# Patient Record
Sex: FEMALE | Race: BLACK OR AFRICAN AMERICAN | NOT HISPANIC OR LATINO | Employment: UNEMPLOYED | URBAN - METROPOLITAN AREA
[De-identification: names, ages, dates, MRNs, and addresses within clinical notes are randomized per-mention and may not be internally consistent; named-entity substitution may affect disease eponyms.]

---

## 2023-05-26 LAB
CLUE CELLS: PRESENT
NUGENT SCORE: 4
VAGINITIS-BV + YEAST INTERPRETATION: ABNORMAL
YEAST: ABNORMAL

## 2023-09-13 LAB
CHLAMYDIA TRACH., AMPLIFIED: NEGATIVE
N. GONORRHEA, AMPLIFIED: NEGATIVE

## 2023-09-15 LAB
GENITAL CULTURE: ABNORMAL
GENITAL CULTURE: ABNORMAL

## 2023-11-02 PROBLEM — N89.8 VAGINAL DISCHARGE: Status: ACTIVE | Noted: 2023-11-02

## 2023-11-02 PROBLEM — Z97.5 IUD CONTRACEPTION: Status: ACTIVE | Noted: 2023-11-02

## 2023-11-02 PROBLEM — R71.0 HEMOGLOBIN DROP: Status: ACTIVE | Noted: 2023-11-02

## 2023-11-02 PROBLEM — G47.00 INSOMNIA: Status: ACTIVE | Noted: 2023-11-02

## 2023-11-02 PROBLEM — B37.31 CANDIDA VAGINITIS: Status: ACTIVE | Noted: 2023-11-02

## 2023-11-02 PROBLEM — D25.9 FIBROID UTERUS: Status: ACTIVE | Noted: 2023-11-02

## 2023-11-02 PROBLEM — J30.9 ALLERGIC RHINITIS: Status: ACTIVE | Noted: 2023-11-02

## 2023-11-02 PROBLEM — E55.9 VITAMIN D DEFICIENCY: Status: ACTIVE | Noted: 2023-11-02

## 2023-11-02 PROBLEM — R51.9 HEADACHE: Status: ACTIVE | Noted: 2023-11-02

## 2023-11-02 PROBLEM — S52.121A RIGHT RADIAL HEAD FRACTURE: Status: ACTIVE | Noted: 2023-11-02

## 2023-11-02 PROBLEM — N76.0 VAGINITIS: Status: ACTIVE | Noted: 2023-11-02

## 2023-11-02 PROBLEM — R14.3 FLATULENCE: Status: ACTIVE | Noted: 2023-11-02

## 2023-11-02 PROBLEM — N92.0 MENORRHAGIA: Status: ACTIVE | Noted: 2023-11-02

## 2023-11-02 PROBLEM — R73.01 IFG (IMPAIRED FASTING GLUCOSE): Status: ACTIVE | Noted: 2023-11-02

## 2023-11-02 PROBLEM — G43.829 MENSTRUAL MIGRAINE: Status: ACTIVE | Noted: 2023-11-02

## 2023-11-02 PROBLEM — N92.6 IRREGULAR BLEEDING: Status: ACTIVE | Noted: 2023-11-02

## 2023-11-02 PROBLEM — F43.89 ADJUSTMENT REACTION TO CHRONIC STRESS: Status: ACTIVE | Noted: 2023-11-02

## 2023-11-02 PROBLEM — S63.501A SPRAIN OF RIGHT WRIST: Status: ACTIVE | Noted: 2023-11-02

## 2023-11-02 PROBLEM — R53.83 FATIGUE: Status: ACTIVE | Noted: 2023-11-02

## 2023-11-02 PROBLEM — R89.9 ABNORMAL LABORATORY TEST RESULT: Status: ACTIVE | Noted: 2023-11-02

## 2023-11-02 PROBLEM — R92.30 DENSE BREAST TISSUE ON MAMMOGRAM: Status: ACTIVE | Noted: 2023-11-02

## 2023-11-02 PROBLEM — N83.8 OVARIAN MASS, LEFT: Status: ACTIVE | Noted: 2023-11-02

## 2023-11-02 PROBLEM — D50.9 IDA (IRON DEFICIENCY ANEMIA): Status: ACTIVE | Noted: 2023-11-02

## 2023-11-02 PROBLEM — S59.909A ELBOW INJURY: Status: ACTIVE | Noted: 2023-11-02

## 2023-11-02 RX ORDER — FLUTICASONE PROPIONATE 50 MCG
1 SPRAY, SUSPENSION (ML) NASAL 2 TIMES DAILY
COMMUNITY
Start: 2020-12-30 | End: 2023-11-17

## 2023-11-02 RX ORDER — VIT C/E/ZN/COPPR/LUTEIN/ZEAXAN 250MG-90MG
CAPSULE ORAL
COMMUNITY
Start: 2021-06-24

## 2023-11-02 RX ORDER — NORETHINDRONE ACETATE AND ETHINYL ESTRADIOL .03; 1.5 MG/1; MG/1
1 TABLET ORAL DAILY
COMMUNITY
Start: 2019-03-11 | End: 2023-11-03 | Stop reason: ALTCHOICE

## 2023-11-02 RX ORDER — SERTRALINE HYDROCHLORIDE 50 MG/1
1 TABLET, FILM COATED ORAL DAILY
COMMUNITY
Start: 2022-04-12 | End: 2023-11-03 | Stop reason: ALTCHOICE

## 2023-11-02 RX ORDER — AZELASTINE 1 MG/ML
SPRAY, METERED NASAL 2 TIMES DAILY
COMMUNITY
Start: 2022-04-12 | End: 2023-11-17

## 2023-11-02 RX ORDER — TRAZODONE HYDROCHLORIDE 50 MG/1
1 TABLET ORAL NIGHTLY PRN
COMMUNITY
Start: 2019-10-17 | End: 2023-11-03 | Stop reason: ALTCHOICE

## 2023-11-03 ENCOUNTER — OFFICE VISIT (OUTPATIENT)
Dept: OBSTETRICS AND GYNECOLOGY | Facility: CLINIC | Age: 43
End: 2023-11-03
Payer: COMMERCIAL

## 2023-11-03 VITALS
BODY MASS INDEX: 28.88 KG/M2 | HEIGHT: 63 IN | DIASTOLIC BLOOD PRESSURE: 75 MMHG | WEIGHT: 163 LBS | SYSTOLIC BLOOD PRESSURE: 121 MMHG

## 2023-11-03 DIAGNOSIS — Z30.011 ENCOUNTER FOR INITIAL PRESCRIPTION OF CONTRACEPTIVE PILLS: Primary | ICD-10-CM

## 2023-11-03 PROCEDURE — 1036F TOBACCO NON-USER: CPT | Performed by: OBSTETRICS & GYNECOLOGY

## 2023-11-03 PROCEDURE — 99214 OFFICE O/P EST MOD 30 MIN: CPT | Performed by: OBSTETRICS & GYNECOLOGY

## 2023-11-03 RX ORDER — NORETHINDRONE ACETATE AND ETHINYL ESTRADIOL 1MG-20(21)
1 KIT ORAL DAILY
Qty: 28 TABLET | Refills: 11 | Status: SHIPPED | OUTPATIENT
Start: 2023-11-03 | End: 2023-11-17

## 2023-11-03 RX ORDER — IBUPROFEN 400 MG/1
400 TABLET ORAL EVERY 6 HOURS PRN
COMMUNITY
Start: 2023-01-30 | End: 2023-11-17

## 2023-11-03 RX ORDER — LEVONORGESTREL 52 MG/1
INTRAUTERINE DEVICE INTRAUTERINE
COMMUNITY
Start: 2023-03-16 | End: 2023-11-17

## 2023-11-03 RX ORDER — ACETAMINOPHEN 325 MG/1
650 TABLET ORAL EVERY 4 HOURS PRN
COMMUNITY
Start: 2023-01-30 | End: 2023-11-17

## 2023-11-03 ASSESSMENT — PAIN SCALES - GENERAL: PAINLEVEL: 0-NO PAIN

## 2023-11-03 NOTE — PROGRESS NOTES
Patient is here for removal of IUD patient has been using IUD for 3 years but for the last 6 months this irregular bleeding on and off quite frequent patient would like to be removed and she will start oral contraceptives  CVA negative abdomen soft without masses or tenderness EXTR no joint normal vagina clear bloody discharge in the vaginal vault cervix with IUD string visible in the cervix uterus midposition slightly tender adnexa negative    IUD removed without difficulty using uterine forcepsExamination of IUD after removal confirmed that IUD was completely  Impression satisfactory removal of the IUD contraceptive management patient will start Junel 120

## 2023-11-09 ENCOUNTER — TELEPHONE (OUTPATIENT)
Dept: OBSTETRICS AND GYNECOLOGY | Facility: CLINIC | Age: 43
End: 2023-11-09

## 2023-11-09 NOTE — TELEPHONE ENCOUNTER
Patient has had symptoms of Heavy excessive bleeding for 2 days since having her iud removed on last Friday patient would like to speak with her Dr. Contact [phone]

## 2023-11-16 ENCOUNTER — HOSPITAL ENCOUNTER (EMERGENCY)
Facility: HOSPITAL | Age: 43
Discharge: HOME | End: 2023-11-16
Attending: EMERGENCY MEDICINE
Payer: COMMERCIAL

## 2023-11-16 VITALS
RESPIRATION RATE: 18 BRPM | OXYGEN SATURATION: 99 % | DIASTOLIC BLOOD PRESSURE: 89 MMHG | HEART RATE: 83 BPM | SYSTOLIC BLOOD PRESSURE: 135 MMHG | TEMPERATURE: 98.2 F

## 2023-11-16 DIAGNOSIS — Z87.42 HISTORY OF HEAVY VAGINAL BLEEDING: Primary | ICD-10-CM

## 2023-11-16 DIAGNOSIS — Z86.018 HISTORY OF UTERINE FIBROID: ICD-10-CM

## 2023-11-16 LAB
ALBUMIN SERPL BCP-MCNC: 4.4 G/DL (ref 3.4–5)
ALP SERPL-CCNC: 43 U/L (ref 33–110)
ALT SERPL W P-5'-P-CCNC: 11 U/L (ref 7–45)
ANION GAP SERPL CALC-SCNC: 11 MMOL/L (ref 10–20)
AST SERPL W P-5'-P-CCNC: 17 U/L (ref 9–39)
B-HCG SERPL-ACNC: <2 MIU/ML
BASOPHILS # BLD AUTO: 0.05 X10*3/UL (ref 0–0.1)
BASOPHILS NFR BLD AUTO: 0.9 %
BILIRUB SERPL-MCNC: 0.4 MG/DL (ref 0–1.2)
BUN SERPL-MCNC: 20 MG/DL (ref 6–23)
CALCIUM SERPL-MCNC: 8.8 MG/DL (ref 8.6–10.3)
CHLORIDE SERPL-SCNC: 108 MMOL/L (ref 98–107)
CO2 SERPL-SCNC: 24 MMOL/L (ref 21–32)
CREAT SERPL-MCNC: 1.08 MG/DL (ref 0.5–1.05)
EOSINOPHIL # BLD AUTO: 0.1 X10*3/UL (ref 0–0.7)
EOSINOPHIL NFR BLD AUTO: 1.9 %
ERYTHROCYTE [DISTWIDTH] IN BLOOD BY AUTOMATED COUNT: 14.7 % (ref 11.5–14.5)
GFR SERPL CREATININE-BSD FRML MDRD: 65 ML/MIN/1.73M*2
GLUCOSE SERPL-MCNC: 85 MG/DL (ref 74–99)
HCT VFR BLD AUTO: 28.7 % (ref 36–46)
HGB BLD-MCNC: 8.6 G/DL (ref 12–16)
IMM GRANULOCYTES # BLD AUTO: 0.02 X10*3/UL (ref 0–0.7)
IMM GRANULOCYTES NFR BLD AUTO: 0.4 % (ref 0–0.9)
LYMPHOCYTES # BLD AUTO: 1.45 X10*3/UL (ref 1.2–4.8)
LYMPHOCYTES NFR BLD AUTO: 27.4 %
MCH RBC QN AUTO: 26.3 PG (ref 26–34)
MCHC RBC AUTO-ENTMCNC: 30 G/DL (ref 32–36)
MCV RBC AUTO: 88 FL (ref 80–100)
MONOCYTES # BLD AUTO: 0.41 X10*3/UL (ref 0.1–1)
MONOCYTES NFR BLD AUTO: 7.7 %
NEUTROPHILS # BLD AUTO: 3.27 X10*3/UL (ref 1.2–7.7)
NEUTROPHILS NFR BLD AUTO: 61.7 %
NRBC BLD-RTO: 0 /100 WBCS (ref 0–0)
PLATELET # BLD AUTO: 304 X10*3/UL (ref 150–450)
POTASSIUM SERPL-SCNC: 3.7 MMOL/L (ref 3.5–5.3)
PROT SERPL-MCNC: 7.6 G/DL (ref 6.4–8.2)
RBC # BLD AUTO: 3.27 X10*6/UL (ref 4–5.2)
SODIUM SERPL-SCNC: 139 MMOL/L (ref 136–145)
WBC # BLD AUTO: 5.3 X10*3/UL (ref 4.4–11.3)

## 2023-11-16 PROCEDURE — 84702 CHORIONIC GONADOTROPIN TEST: CPT | Performed by: EMERGENCY MEDICINE

## 2023-11-16 PROCEDURE — 36415 COLL VENOUS BLD VENIPUNCTURE: CPT | Performed by: EMERGENCY MEDICINE

## 2023-11-16 PROCEDURE — 99285 EMERGENCY DEPT VISIT HI MDM: CPT | Performed by: EMERGENCY MEDICINE

## 2023-11-16 PROCEDURE — 99284 EMERGENCY DEPT VISIT MOD MDM: CPT

## 2023-11-16 PROCEDURE — 80053 COMPREHEN METABOLIC PANEL: CPT | Performed by: EMERGENCY MEDICINE

## 2023-11-16 PROCEDURE — 99283 EMERGENCY DEPT VISIT LOW MDM: CPT

## 2023-11-16 PROCEDURE — 85025 COMPLETE CBC W/AUTO DIFF WBC: CPT | Performed by: EMERGENCY MEDICINE

## 2023-11-16 RX ORDER — NORETHINDRONE 5 MG/1
5 TABLET ORAL DAILY
Qty: 7 TABLET | Refills: 0 | Status: SHIPPED | OUTPATIENT
Start: 2023-11-16 | End: 2023-11-30 | Stop reason: ALTCHOICE

## 2023-11-16 ASSESSMENT — COLUMBIA-SUICIDE SEVERITY RATING SCALE - C-SSRS
1. IN THE PAST MONTH, HAVE YOU WISHED YOU WERE DEAD OR WISHED YOU COULD GO TO SLEEP AND NOT WAKE UP?: NO
6. HAVE YOU EVER DONE ANYTHING, STARTED TO DO ANYTHING, OR PREPARED TO DO ANYTHING TO END YOUR LIFE?: NO
2. HAVE YOU ACTUALLY HAD ANY THOUGHTS OF KILLING YOURSELF?: NO

## 2023-11-16 NOTE — ED PROVIDER NOTES
HPI   Chief Complaint   Patient presents with    Vaginal Bleeding     Patient got an IUD in April started bleeding.  Someday's very heavy requiring disposable underwear other days just spotting. Got IUD removed on Nov 3rd.  Still passing chicken nugget sized clots with little bleeding. Denies blood thinners       HPI  Patient is a 43-year-old female with a past medical history significant for G6PD deficiency, anemia, uterine fibroids who presents for vaginal bleeding.  Patient states that she has a history of menometrorrhagia.  She was on an IUD that was placed in April of this year but continued to have spotting and bleeding so it was discontinued in favor of starting her on oral contraceptive.  She is currently on Blisovi 24 FE.  She states that despite this she continues to have heavy bleeding and since discontinuation of her IUD on 3 November she has progressively more bleeding.  On November 5 through 8 she was going through 1 pad per day.  Yesterday and today she has noted clots.  She denies any significant pain, dizziness, lightheadedness, syncope.  She is here because she is frustrated that she continues to bleed and the oral contraceptives are not helping.      PMHx: As above  PSHx: Denies pertinent  FamilyHx: Denies pertinent  SocialHx: Denies  Allergies: NKDA  Medications: See Medication Reconciliation     ROS  As above but otherwise denies    Physical Exam    GENERAL: Awake and Alert, No Acute Distress  HEENT: AT/NC, PERRL, EOMI, Normal Oropharynx, No Signs of Dehydration  NECK: Normal Inspection, No JVD  CARDIOVASCULAR: RRR, No M/R/G  RESPIRATORY: CTA Bilaterally, No Wheezes, Rales or Rhonchi, Chest Wall Non-tender  ABDOMEN: Soft, non-tender abdomen, Normal Bowel Sounds, No Distention  BACK: No CVA Tenderness  SKIN: Normal Color, Warm, Dry, No Rashes   EXTREMITIES: Non-Tender, Full ROM, No Pedal Edema  NEURO: A&O x 3, Normal Motor and Sensation, Normal Mood and Affect    Nursing Assessment and Vitals  Reviewed    Medical Decision  Patient is a very well-appearing 43-year-old female with a past medical history significant for menorrhagia, known uterine fibroids who presents for ongoing bleeding.  Review of records show that patient has undergone both pelvic ultrasounds and MRIs of the pelvis this year.  Results have confirmed an enlarged multifibroid uterus somewhat submucosal extensions and exophytic subserosal fibroids were also appreciated.  She has failed an IUD and now states that her oral contraceptives are not enough.  She arrives with stable vital signs, well-appearing and in no acute distress with a benign abdomen.  Lab work does confirm hemoglobin of 8.6 down from 10.6 a year ago.  Patient was discussed with gynecology on-call, Dr Lynch, who is currently recommending starting norethindrone 5 mg daily and a follow-up in the office tomorrow with another CBC to determine stability of hemoglobin.    Patient is instructed to discontinue the Blisovi until follow-up with gynecology and start the Norethindrone.  She has been referred to Dr. Lynch who agrees to follow in her care but I was also able to reach her primary gynecologist at her request who will try to see her in the office as well.  Patient is discharged in stable condition with education on signs and symptoms that should prompt immediate turn to emergency room.                          No data recorded                Patient History   Past Medical History:   Diagnosis Date    Acute upper respiratory infection, unspecified 07/01/2019    Acute upper respiratory infection    Acute upper respiratory infection, unspecified 01/22/2018    Viral URI with cough    Immunization not carried out because of patient refusal 01/22/2018    Influenza vaccine refused    Other conditions influencing health status 07/01/2019    History of cough    Personal history of other medical treatment 01/22/2018    History of screening mammography     Past Surgical History:    Procedure Laterality Date    DILATION AND CURETTAGE OF UTERUS  04/05/2018    Dilation And Curettage    HERNIA REPAIR  04/05/2018    Hernia Repair     No family history on file.  Social History     Tobacco Use    Smoking status: Never    Smokeless tobacco: Never   Vaping Use    Vaping Use: Never used   Substance Use Topics    Alcohol use: Yes     Comment: occasionally    Drug use: Never       Physical Exam   ED Triage Vitals [11/16/23 1157]   Temp Heart Rate Resp BP   36.8 °C (98.2 °F) 83 18 (!) 163/104      SpO2 Temp Source Heart Rate Source Patient Position   100 % Tympanic Monitor --      BP Location FiO2 (%)     -- --       Physical Exam    ED Course & MDM        Medical Decision Making      Procedure  Procedures     Gertrude Parikh MD  11/16/23 1305

## 2023-11-16 NOTE — DISCHARGE INSTRUCTIONS
Please discontinue the Blisovi in favor of starting the norethindrone 5 mg daily.  Call to schedule follow-up appointment with Dr. Lynch tomorrow in the office per our discussion.  Return immediately if concerning symptoms, as discussed.

## 2023-11-17 ENCOUNTER — OFFICE VISIT (OUTPATIENT)
Dept: OBSTETRICS AND GYNECOLOGY | Facility: CLINIC | Age: 43
End: 2023-11-17
Payer: COMMERCIAL

## 2023-11-17 VITALS
BODY MASS INDEX: 31.28 KG/M2 | SYSTOLIC BLOOD PRESSURE: 134 MMHG | WEIGHT: 170 LBS | DIASTOLIC BLOOD PRESSURE: 77 MMHG | HEIGHT: 62 IN

## 2023-11-17 DIAGNOSIS — D50.0 IRON DEFICIENCY ANEMIA DUE TO CHRONIC BLOOD LOSS: ICD-10-CM

## 2023-11-17 DIAGNOSIS — N93.9 ABNORMAL UTERINE BLEEDING (AUB): Primary | ICD-10-CM

## 2023-11-17 PROCEDURE — 1036F TOBACCO NON-USER: CPT | Performed by: STUDENT IN AN ORGANIZED HEALTH CARE EDUCATION/TRAINING PROGRAM

## 2023-11-17 PROCEDURE — 88305 TISSUE EXAM BY PATHOLOGIST: CPT | Performed by: PATHOLOGY

## 2023-11-17 PROCEDURE — 58100 BIOPSY OF UTERUS LINING: CPT | Performed by: STUDENT IN AN ORGANIZED HEALTH CARE EDUCATION/TRAINING PROGRAM

## 2023-11-17 PROCEDURE — 99214 OFFICE O/P EST MOD 30 MIN: CPT | Performed by: STUDENT IN AN ORGANIZED HEALTH CARE EDUCATION/TRAINING PROGRAM

## 2023-11-17 PROCEDURE — 88305 TISSUE EXAM BY PATHOLOGIST: CPT

## 2023-11-17 RX ORDER — FERROUS SULFATE 325(65) MG
325 TABLET, DELAYED RELEASE (ENTERIC COATED) ORAL
Qty: 30 TABLET | Refills: 11 | Status: SHIPPED | OUTPATIENT
Start: 2023-11-17 | End: 2024-11-16

## 2023-11-17 ASSESSMENT — PAIN SCALES - GENERAL: PAINLEVEL: 0-NO PAIN

## 2023-11-17 ASSESSMENT — ENCOUNTER SYMPTOMS
ALLERGIC/IMMUNOLOGIC NEGATIVE: 0
NEUROLOGICAL NEGATIVE: 0
ENDOCRINE NEGATIVE: 0
LOSS OF SENSATION IN FEET: 0
EYES NEGATIVE: 0
PSYCHIATRIC NEGATIVE: 0
CONSTITUTIONAL NEGATIVE: 0
GASTROINTESTINAL NEGATIVE: 0
MUSCULOSKELETAL NEGATIVE: 0
CARDIOVASCULAR NEGATIVE: 0
HEMATOLOGIC/LYMPHATIC NEGATIVE: 0
DEPRESSION: 0
RESPIRATORY NEGATIVE: 0
OCCASIONAL FEELINGS OF UNSTEADINESS: 0

## 2023-11-17 ASSESSMENT — PATIENT HEALTH QUESTIONNAIRE - PHQ9
SUM OF ALL RESPONSES TO PHQ9 QUESTIONS 1 & 2: 0
2. FEELING DOWN, DEPRESSED OR HOPELESS: NOT AT ALL
1. LITTLE INTEREST OR PLEASURE IN DOING THINGS: NOT AT ALL

## 2023-11-18 NOTE — PROGRESS NOTES
Subjective   Patient ID: Gabriela Cabrera is a 43 y.o. female who presents for New Patient Visit (Follow up from the ED Patient here for fibroids, heavy bleeding,last pap 8/24/21 ASCUS HPV negative last mammogram 2/15/23 benign).  43-year-old G4, P2 here for evaluation of abnormal uterine bleeding likely secondary to fibroid uterus.  She has been having AUB for the last 5 to 6 years.  Of note her AUB started while she was using OCPs.  In April of this year she had levonorgestrel IUD placed.  However she felt that her bleeding was worse with the IUD.  So was removed earlier this month.  Her bleeding then worsened even further.  She was then placed on OCPs once more but her bleeding has not under control.  In terms of her bleeding she reports wearing pads or diapers daily and is afraid to wear the clothing she likes or even have sex due to bleeding.  She says she bleeds most days of the month and often has spotting in between periods.  She feels frustrated that her bleeding complaints have largely been dismissed or minimized by providers over the last several months.  She strongly desires definitive management of her AUB.  She has never had a blood transfusion but has had an iron infusion to which she had a bad reaction (swelling in feet).  She is amenable to a trial of Lupron with Aygestin add back while awaiting surgery.        Review of Systems    Objective   Physical Exam  Exam conducted with a chaperone present.   Constitutional:       General: She is not in acute distress.     Appearance: Normal appearance. She is not ill-appearing.   Pulmonary:      Effort: Pulmonary effort is normal.   Abdominal:      General: There is no distension.      Palpations: Abdomen is soft. There is no mass.      Tenderness: There is no abdominal tenderness. There is no guarding or rebound.   Genitourinary:     General: Normal vulva.      Comments: Normal external female genitalia urethra and anus  Speculum: Vagina pink with  rugae, cervix with no lesions, physiologic discharge, moderate amount of dark blood   Bimanual: 12 to 14-week size uterus with irregular contour, mobile and distant from the pelvic sidewalls bilaterally nontender, no adnexal masses  Rectovaginal: RV septum smooth, significant posterior uterine bulk appreciated with rectal finger    Musculoskeletal:      Right lower leg: No edema.      Left lower leg: No edema.   Neurological:      Mental Status: She is alert.         Assessment/Plan   Diagnoses and all orders for this visit:  Abnormal uterine bleeding (AUB)  -     Surgical Pathology Exam  -     leuprolide, 3-month, (Lupron Depot) 11.25 mg injection; Inject 11.25 mg into the muscle 1 time for 1 dose.  Iron deficiency anemia due to chronic blood loss  -     ferrous sulfate 325 (65 Fe) MG EC tablet; Take 1 tablet (325 mg) by mouth once daily with a meal. Do not crush, chew, or split.    Patient presents for evaluation of AUB.  Patient has failed OCPs and levonorgestrel IUD she also strongly desires definitive surgical management with hysterectomy.  Pap is up-to-date.  However she does not have an endometrial biopsy; so an endometrial biopsy was performed.  We will plan surgical intervention once endometrial biopsy has resulted.  Medical management will be with Lupron Aygestin in the interim.    Patient ID: Gabriela Cabrera is a 43 y.o. female.    Endometrial biopsy    Date/Time: 11/17/2023 8:50 PM    Performed by: Berhane Yeboah MD  Authorized by: Berhane Yeboah MD    Consent:     Consent obtained: written and verbal    Consent given by: patient    Risks discussed: infection  Indications:     Indications: abnormal uterine bleeding      Chronicity of post-menopausal bleeding: chronic  Pre-procedure:     Urine pregnancy test: negative    Procedure:     A bimanual exam was performed: yes      Uterus size: 13-14 weeks    Uterus position: midposition    Prepped with: Betadine    Tenaculum used: yes      A  local block was performed: no      Cervix dilated: no      Number of passes: 6  Findings:     Cervix: normal      Uterus depth by sound (cm): 7    Specimen collected: specimen collected and sent to pathology      Patient tolerance: tolerated well, no immediate complications

## 2023-11-21 ENCOUNTER — TELEPHONE (OUTPATIENT)
Dept: OBSTETRICS AND GYNECOLOGY | Facility: CLINIC | Age: 43
End: 2023-11-21
Payer: COMMERCIAL

## 2023-11-21 DIAGNOSIS — N93.9 ABNORMAL UTERINE BLEEDING (AUB): Primary | ICD-10-CM

## 2023-11-21 NOTE — TELEPHONE ENCOUNTER
"DR, PATIENT CALLED TODAY ASKING TO SPEAK TO WITH IN REGARDS TO PRESCRIPTION  LEUPROLIDE. NEEDING PRIOR AUTHORIZATION. MENTIONING THAT THIS CAN BE \"VERBAL\" AUTHORIZATION.    THANK YOU  "

## 2023-11-30 ENCOUNTER — APPOINTMENT (OUTPATIENT)
Dept: OBSTETRICS AND GYNECOLOGY | Facility: CLINIC | Age: 43
End: 2023-11-30
Payer: COMMERCIAL

## 2023-11-30 RX ORDER — NORETHINDRONE 5 MG/1
10 TABLET ORAL DAILY
Qty: 180 TABLET | Refills: 3 | Status: SHIPPED | OUTPATIENT
Start: 2023-11-30 | End: 2024-02-29 | Stop reason: HOSPADM

## 2023-11-30 NOTE — TELEPHONE ENCOUNTER
.    DR. NULL :    THIS PATIENT WAS INFORMED OF THE COST OF THE MEDICATION UPON THE PA APPROVAL AND SHE WAS INFORMED THAT YOU WILL BE CALLING HER TO DISCUSS POSSIBLY SOME ALTERNATIVES. SHE CAN BE REACHED AT THE CURRENT NUMBER IN THE CHART.      THANK YOU

## 2023-12-05 LAB
LABORATORY COMMENT REPORT: NORMAL
PATH REPORT.FINAL DX SPEC: NORMAL
PATH REPORT.GROSS SPEC: NORMAL
PATH REPORT.RELEVANT HX SPEC: NORMAL
PATH REPORT.TOTAL CANCER: NORMAL

## 2023-12-08 ENCOUNTER — OFFICE VISIT (OUTPATIENT)
Dept: OBSTETRICS AND GYNECOLOGY | Facility: CLINIC | Age: 43
End: 2023-12-08
Payer: COMMERCIAL

## 2023-12-08 VITALS
BODY MASS INDEX: 30.36 KG/M2 | SYSTOLIC BLOOD PRESSURE: 124 MMHG | DIASTOLIC BLOOD PRESSURE: 80 MMHG | HEIGHT: 62 IN | WEIGHT: 165 LBS

## 2023-12-08 DIAGNOSIS — D50.0 IRON DEFICIENCY ANEMIA DUE TO CHRONIC BLOOD LOSS: ICD-10-CM

## 2023-12-08 DIAGNOSIS — N93.9 ABNORMAL UTERINE BLEEDING (AUB): Primary | ICD-10-CM

## 2023-12-08 PROCEDURE — 99213 OFFICE O/P EST LOW 20 MIN: CPT | Performed by: STUDENT IN AN ORGANIZED HEALTH CARE EDUCATION/TRAINING PROGRAM

## 2023-12-08 PROCEDURE — 1036F TOBACCO NON-USER: CPT | Performed by: STUDENT IN AN ORGANIZED HEALTH CARE EDUCATION/TRAINING PROGRAM

## 2023-12-08 ASSESSMENT — ENCOUNTER SYMPTOMS
GASTROINTESTINAL NEGATIVE: 0
ALLERGIC/IMMUNOLOGIC NEGATIVE: 0
NEUROLOGICAL NEGATIVE: 0
EYES NEGATIVE: 0
PSYCHIATRIC NEGATIVE: 0
ENDOCRINE NEGATIVE: 0
RESPIRATORY NEGATIVE: 0
MUSCULOSKELETAL NEGATIVE: 0
CONSTITUTIONAL NEGATIVE: 0
HEMATOLOGIC/LYMPHATIC NEGATIVE: 0
CARDIOVASCULAR NEGATIVE: 0

## 2023-12-08 ASSESSMENT — PAIN SCALES - GENERAL: PAINLEVEL: 0-NO PAIN

## 2023-12-08 NOTE — PROGRESS NOTES
"Subjective   Patient ID: Gabriela Cabrera is a 43 y.o. female who presents for Follow-up (Patient here to discuss biopsy and to schedule surgery last pap 12/20/22 wnl HPV negative last mammogram 2/15/23 benign).  Patient here for results and surgical planning.  Says that bleeding is now well-controlled with 10 mg of daily norethindrone.  She says she has not had any bleeding since Sunday.  She is taking daily oral iron.  She affirms her desire for definitive surgical management with hysterectomy even though her bleeding is currently well-controlled with medical management.        Review of Systems   All other systems reviewed and are negative.      Objective   Physical Exam  Constitutional:       General: She is not in acute distress.     Appearance: She is not ill-appearing.   Pulmonary:      Effort: Pulmonary effort is normal.   Skin:     Coloration: Skin is not pale.   Neurological:      Mental Status: She is alert.         Assessment/Plan   Diagnoses and all orders for this visit:  Abnormal uterine bleeding (AUB)  -     CBC; Future  -     Basic metabolic panel; Future  -     Hemoglobin A1C; Future  -     TSH with reflex to Free T4 if abnormal; Future  Iron deficiency anemia due to chronic blood loss  -     CBC; Future       Patient here for surgical planning.  Reviewed endometrial biopsy results: \"Blood clot and very rare superficial strips of cytologically benign glandular epithelium.\"  Discussed that this is essentially a benign result however because limited epithelium was identified the there remains some elevated risk of occult malignancy.  Offered patient repeat endometrial biopsy versus proceeding with hysterectomy as desired and patient prefers to avoid repeat endometrial biopsy.  Explained that in this setting would recommend hysterectomy with removal of specimen intact given the possibility of occult malignancy patient expresses understanding and agrees with that plan.  Discussed option for " laparoscopic versus open approach for hysterectomy in the setting of large fibroid uterus and planned removal intact.  Patient prefers laparotomy.  The risks and benefits and alternatives of hysterectomy and bilateral salpingectomy were discussed with patient however will review these risks at next visit and have patient signed consents.  At next visit we will check hemoglobin to ensure that patient has met hemoglobin goal of 10.0 or greater prior to surgery.    Berhane Yeboah MD 12/08/23 12:50 PM

## 2023-12-12 ENCOUNTER — TELEPHONE (OUTPATIENT)
Dept: OBSTETRICS AND GYNECOLOGY | Facility: CLINIC | Age: 43
End: 2023-12-12

## 2024-01-03 ENCOUNTER — OFFICE VISIT (OUTPATIENT)
Dept: OBSTETRICS AND GYNECOLOGY | Facility: CLINIC | Age: 44
End: 2024-01-03
Payer: COMMERCIAL

## 2024-01-03 ENCOUNTER — LAB (OUTPATIENT)
Dept: LAB | Facility: LAB | Age: 44
End: 2024-01-03
Payer: COMMERCIAL

## 2024-01-03 VITALS
WEIGHT: 166 LBS | DIASTOLIC BLOOD PRESSURE: 84 MMHG | SYSTOLIC BLOOD PRESSURE: 132 MMHG | HEIGHT: 62 IN | BODY MASS INDEX: 30.55 KG/M2

## 2024-01-03 DIAGNOSIS — N93.9 ABNORMAL UTERINE BLEEDING (AUB): Primary | ICD-10-CM

## 2024-01-03 DIAGNOSIS — N93.9 ABNORMAL UTERINE BLEEDING (AUB): ICD-10-CM

## 2024-01-03 DIAGNOSIS — N89.8 VAGINAL DISCHARGE: ICD-10-CM

## 2024-01-03 DIAGNOSIS — D50.0 IRON DEFICIENCY ANEMIA DUE TO CHRONIC BLOOD LOSS: ICD-10-CM

## 2024-01-03 DIAGNOSIS — Z11.3 SCREEN FOR STD (SEXUALLY TRANSMITTED DISEASE): ICD-10-CM

## 2024-01-03 DIAGNOSIS — N89.8 VAGINAL LESION: ICD-10-CM

## 2024-01-03 LAB
ABO GROUP (TYPE) IN BLOOD: NORMAL
ANION GAP SERPL CALC-SCNC: 12 MMOL/L (ref 10–20)
ANTIBODY SCREEN: NORMAL
BUN SERPL-MCNC: 19 MG/DL (ref 6–23)
CALCIUM SERPL-MCNC: 9 MG/DL (ref 8.6–10.3)
CHLORIDE SERPL-SCNC: 105 MMOL/L (ref 98–107)
CO2 SERPL-SCNC: 23 MMOL/L (ref 21–32)
CREAT SERPL-MCNC: 1 MG/DL (ref 0.5–1.05)
ERYTHROCYTE [DISTWIDTH] IN BLOOD BY AUTOMATED COUNT: 14.9 % (ref 11.5–14.5)
EST. AVERAGE GLUCOSE BLD GHB EST-MCNC: 91 MG/DL
GFR SERPL CREATININE-BSD FRML MDRD: 72 ML/MIN/1.73M*2
GLUCOSE SERPL-MCNC: 120 MG/DL (ref 74–99)
HBA1C MFR BLD: 4.8 %
HBV SURFACE AG SERPL QL IA: NONREACTIVE
HCT VFR BLD AUTO: 32.3 % (ref 36–46)
HCV AB SER QL: NONREACTIVE
HGB BLD-MCNC: 9.7 G/DL (ref 12–16)
HIV 1+2 AB+HIV1 P24 AG SERPL QL IA: NONREACTIVE
MCH RBC QN AUTO: 24.9 PG (ref 26–34)
MCHC RBC AUTO-ENTMCNC: 30 G/DL (ref 32–36)
MCV RBC AUTO: 83 FL (ref 80–100)
NRBC BLD-RTO: 0 /100 WBCS (ref 0–0)
PLATELET # BLD AUTO: 375 X10*3/UL (ref 150–450)
POTASSIUM SERPL-SCNC: 4.2 MMOL/L (ref 3.5–5.3)
RBC # BLD AUTO: 3.89 X10*6/UL (ref 4–5.2)
RH FACTOR (ANTIGEN D): NORMAL
SODIUM SERPL-SCNC: 136 MMOL/L (ref 136–145)
T PALLIDUM AB SER QL: NONREACTIVE
TSH SERPL-ACNC: 1.42 MIU/L (ref 0.44–3.98)
WBC # BLD AUTO: 5.5 X10*3/UL (ref 4.4–11.3)

## 2024-01-03 PROCEDURE — 87205 SMEAR GRAM STAIN: CPT

## 2024-01-03 PROCEDURE — 1036F TOBACCO NON-USER: CPT | Performed by: STUDENT IN AN ORGANIZED HEALTH CARE EDUCATION/TRAINING PROGRAM

## 2024-01-03 PROCEDURE — 86780 TREPONEMA PALLIDUM: CPT

## 2024-01-03 PROCEDURE — 87389 HIV-1 AG W/HIV-1&-2 AB AG IA: CPT

## 2024-01-03 PROCEDURE — 86803 HEPATITIS C AB TEST: CPT

## 2024-01-03 PROCEDURE — 86900 BLOOD TYPING SEROLOGIC ABO: CPT

## 2024-01-03 PROCEDURE — 86850 RBC ANTIBODY SCREEN: CPT

## 2024-01-03 PROCEDURE — 83036 HEMOGLOBIN GLYCOSYLATED A1C: CPT

## 2024-01-03 PROCEDURE — 86901 BLOOD TYPING SEROLOGIC RH(D): CPT

## 2024-01-03 PROCEDURE — 80048 BASIC METABOLIC PNL TOTAL CA: CPT

## 2024-01-03 PROCEDURE — 87340 HEPATITIS B SURFACE AG IA: CPT

## 2024-01-03 PROCEDURE — 87800 DETECT AGNT MULT DNA DIREC: CPT

## 2024-01-03 PROCEDURE — 36415 COLL VENOUS BLD VENIPUNCTURE: CPT

## 2024-01-03 PROCEDURE — 99213 OFFICE O/P EST LOW 20 MIN: CPT | Performed by: STUDENT IN AN ORGANIZED HEALTH CARE EDUCATION/TRAINING PROGRAM

## 2024-01-03 PROCEDURE — 85027 COMPLETE CBC AUTOMATED: CPT

## 2024-01-03 PROCEDURE — 87661 TRICHOMONAS VAGINALIS AMPLIF: CPT

## 2024-01-03 PROCEDURE — 84443 ASSAY THYROID STIM HORMONE: CPT

## 2024-01-03 ASSESSMENT — ENCOUNTER SYMPTOMS
NEUROLOGICAL NEGATIVE: 0
MUSCULOSKELETAL NEGATIVE: 0
HEMATOLOGIC/LYMPHATIC NEGATIVE: 0
PSYCHIATRIC NEGATIVE: 0
EYES NEGATIVE: 0
GASTROINTESTINAL NEGATIVE: 0
ENDOCRINE NEGATIVE: 0
ALLERGIC/IMMUNOLOGIC NEGATIVE: 0
CARDIOVASCULAR NEGATIVE: 0
RESPIRATORY NEGATIVE: 0
CONSTITUTIONAL NEGATIVE: 0

## 2024-01-03 ASSESSMENT — PAIN SCALES - GENERAL: PAINLEVEL: 0-NO PAIN

## 2024-01-03 NOTE — PROGRESS NOTES
Subjective   Patient ID: Gabriela Cabrera is a 43 y.o. female who presents for Follow-up (Patient here for a follow up visit last pap last pap 12/20/22 wnl HPV negative last mammogram 2/15/23 benign).  Patient here for AUB follow-up.  Patient says that she has had essentially no bleeding since increasing Aygestin to 10 mg daily.  She does report having a small amount of spotting after she had intercourse for the first time in a while about 2 weeks ago.  She also says that she no longer has any fatigue or dizziness.  She continues to desire definitive management with hysterectomy.    She does report noting what she is concerned are vaginal polyps in the vagina that she had never noticed before as well as some yellowish discharge since her bleeding stopped.  She says that she has not been screened recently for sexually transmitted infections but is amenable to screening.  She does not have any concern about sexually transmitted infections.        Review of Systems   All other systems reviewed and are negative.      Objective   Physical Exam  Exam conducted with a chaperone present.   Constitutional:       General: She is not in acute distress.     Appearance: Normal appearance. She is not ill-appearing.   Pulmonary:      Effort: Pulmonary effort is normal.   Abdominal:      General: There is no distension.      Palpations: Abdomen is soft. There is no mass.      Tenderness: There is no abdominal tenderness. There is no guarding or rebound.   Genitourinary:     General: Normal vulva.      Exam position: Lithotomy position.      Vagina: No signs of injury and foreign body. No vaginal discharge, erythema, tenderness, bleeding, lesions or prolapsed vaginal walls.      Cervix: No discharge, friability, lesion, erythema or cervical bleeding.      Comments: Normal external female genitalia with normal urethra and anus  Vagina pink with rugae and physiologic discharge, hymenal remnant noted at 3:00  Cervix soft and  nontender without lesion  Uterus enlarged with irregular contour  Adnexa nontender with no palpable masses      Musculoskeletal:      Right lower leg: No edema.      Left lower leg: No edema.   Neurological:      Mental Status: She is alert.         Assessment/Plan   Diagnoses and all orders for this visit:  Abnormal uterine bleeding (AUB)  -     Type And Screen; Future  Iron deficiency anemia due to chronic blood loss  Screen for STD (sexually transmitted disease)  -     C. Trachomatis / N. Gonorrhoeae, Amplified Detection; Future  -     HIV 1/2 Antigen/Antibody Screen with Reflex to Confirmation; Future  -     Hepatitis B Surface Antigen; Future  -     Hepatitis C Antibody; Future  -     Syphilis Screen with Reflex; Future  Vaginal discharge  -     TRICH VAGINALIS, AMPLIFIED; Future  -     Vaginitis Gram Stain For Bacterial Vaginosis + Yeast  Vaginal lesion    Patient here for AUB follow-up.  Plan is for  hysterectomy pending hemoglobin check; goal is hemoglobin of 10.  Patient is taking oral iron.  While uterine size is probably amenable to laparoscopic resection endometrial biopsy did not convincingly exclude malignancy or hyperplasia as it was mostly blood with few strips of endometrium.  Patient declines repeat endometrial biopsy and instead prefers abdominal hysterectomy with removal intact.  Reviewed this plan with patient today.     Patient is complaining of a vaginal lesion that on exam is consistent with hymenal remnant.  No further intervention is indicated as patient is not bothered by this finding.  Additionally patient is reporting new vaginal discharge which appears to be physiologic; will send Gram stain as well as STI panel.    Patient to follow-up in 1 month at which time we should be able to schedule her surgery.       Berhane Yeboah MD 01/03/24 10:36 AM

## 2024-01-04 LAB
BACTERIAL VAGINOSIS VAG-IMP: NORMAL
C TRACH RRNA SPEC QL NAA+PROBE: NEGATIVE
CLUE CELLS VAG LPF-#/AREA: NORMAL /[LPF]
N GONORRHOEA DNA SPEC QL PROBE+SIG AMP: NEGATIVE
NUGENT SCORE: 4
T VAGINALIS RRNA SPEC QL NAA+PROBE: NEGATIVE
YEAST VAG WET PREP-#/AREA: NORMAL

## 2024-02-02 PROCEDURE — 85027 COMPLETE CBC AUTOMATED: CPT

## 2024-02-07 ENCOUNTER — OFFICE VISIT (OUTPATIENT)
Dept: OBSTETRICS AND GYNECOLOGY | Facility: CLINIC | Age: 44
End: 2024-02-07
Payer: COMMERCIAL

## 2024-02-07 VITALS
SYSTOLIC BLOOD PRESSURE: 126 MMHG | WEIGHT: 167.2 LBS | DIASTOLIC BLOOD PRESSURE: 83 MMHG | BODY MASS INDEX: 29.62 KG/M2 | HEIGHT: 63 IN

## 2024-02-07 DIAGNOSIS — D50.0 IRON DEFICIENCY ANEMIA DUE TO CHRONIC BLOOD LOSS: ICD-10-CM

## 2024-02-07 DIAGNOSIS — N93.9 ABNORMAL UTERINE BLEEDING (AUB): Primary | ICD-10-CM

## 2024-02-07 PROCEDURE — 1036F TOBACCO NON-USER: CPT | Performed by: STUDENT IN AN ORGANIZED HEALTH CARE EDUCATION/TRAINING PROGRAM

## 2024-02-07 PROCEDURE — 99213 OFFICE O/P EST LOW 20 MIN: CPT | Performed by: STUDENT IN AN ORGANIZED HEALTH CARE EDUCATION/TRAINING PROGRAM

## 2024-02-07 RX ORDER — ACETAMINOPHEN 325 MG/1
975 TABLET ORAL ONCE
Status: CANCELLED | OUTPATIENT
Start: 2024-02-07 | End: 2024-02-07

## 2024-02-07 RX ORDER — CELECOXIB 400 MG/1
400 CAPSULE ORAL ONCE
Status: CANCELLED | OUTPATIENT
Start: 2024-02-07 | End: 2024-02-07

## 2024-02-07 RX ORDER — GABAPENTIN 600 MG/1
600 TABLET ORAL ONCE
Status: CANCELLED | OUTPATIENT
Start: 2024-02-07 | End: 2024-02-07

## 2024-02-07 ASSESSMENT — ENCOUNTER SYMPTOMS
ENDOCRINE NEGATIVE: 0
MUSCULOSKELETAL NEGATIVE: 0
NEUROLOGICAL NEGATIVE: 0
RESPIRATORY NEGATIVE: 0
CONSTITUTIONAL NEGATIVE: 0
HEMATOLOGIC/LYMPHATIC NEGATIVE: 0
CARDIOVASCULAR NEGATIVE: 0
EYES NEGATIVE: 0
PSYCHIATRIC NEGATIVE: 0
ALLERGIC/IMMUNOLOGIC NEGATIVE: 0
GASTROINTESTINAL NEGATIVE: 0

## 2024-02-07 ASSESSMENT — PATIENT HEALTH QUESTIONNAIRE - PHQ9
2. FEELING DOWN, DEPRESSED OR HOPELESS: NOT AT ALL
1. LITTLE INTEREST OR PLEASURE IN DOING THINGS: NOT AT ALL
SUM OF ALL RESPONSES TO PHQ9 QUESTIONS 1 AND 2: 0

## 2024-02-07 NOTE — H&P (VIEW-ONLY)
Subjective   Patient ID: Gabriela Cabrera is a 44 y.o. female who presents for Consult (Pt here for Surgery Consult. Last Pap 12/20/22 wnl. HPV Negative. Mammogram 12/14/23 Negative.).  Patient is here for preoperative visit.  Patient is happy with bleeding pattern on Aygestin; however, she very much continues to desire definitive surgical management with hysterectomy.        Review of Systems   All other systems reviewed and are negative.      Objective   Physical Exam  Constitutional:       General: She is not in acute distress.     Appearance: She is not ill-appearing.   Pulmonary:      Effort: Pulmonary effort is normal.   Skin:     Coloration: Skin is not pale.   Neurological:      Mental Status: She is alert.         Assessment/Plan   Diagnoses and all orders for this visit:  Abnormal uterine bleeding (AUB)  -     Case Request Operating Room: Hysterectomy Transabdominal and any other necessary procedures; Standing  -     Request for Pre-Admission Testing Visit; Future  -     Type And Screen; Future  -     CBC; Future  -     Basic Metabolic Panel; Future  -     hCG, Urine, Qualitative; Future  Iron deficiency anemia due to chronic blood loss  -     Case Request Operating Room: Hysterectomy Transabdominal and any other necessary procedures; Standing  Other orders  -     celecoxib (CeleBREX) capsule 400 mg  -     acetaminophen (Tylenol) tablet 975 mg  -     gabapentin (Neurontin) tablet 600 mg  -     Admit to inpatient; Standing  -     Full code; Standing  -     NPO Diet; Effective now; Standing       Patient here for preoperative visit.  Reviewed patient's uptrending hemoglobin which is now 11.3.  Plan is for total abdominal hysterectomy and bilateral salpingectomy via lower transverse incision with removal intact.  Discussed the risks of this procedure in detail including pain, bleeding, infection, injury to nearby structures, risk of transfusion, risk of reoperation.  Patient expressed understanding.   Questions were asked and answered.  Case request is submitted.  Surgical scheduler will contact patient once that date has been.    Berhane Yeboah MD 02/07/24 10:01 AM

## 2024-02-08 ENCOUNTER — PATIENT MESSAGE (OUTPATIENT)
Dept: OBSTETRICS AND GYNECOLOGY | Facility: CLINIC | Age: 44
End: 2024-02-08
Payer: COMMERCIAL

## 2024-02-19 ENCOUNTER — TELEMEDICINE CLINICAL SUPPORT (OUTPATIENT)
Dept: PREADMISSION TESTING | Facility: HOSPITAL | Age: 44
End: 2024-02-19
Payer: COMMERCIAL

## 2024-02-19 DIAGNOSIS — N93.9 ABNORMAL UTERINE BLEEDING (AUB): ICD-10-CM

## 2024-02-22 ENCOUNTER — LAB (OUTPATIENT)
Dept: LAB | Facility: LAB | Age: 44
End: 2024-02-22
Payer: COMMERCIAL

## 2024-02-22 ENCOUNTER — PRE-ADMISSION TESTING (OUTPATIENT)
Dept: PREADMISSION TESTING | Facility: HOSPITAL | Age: 44
End: 2024-02-22
Payer: COMMERCIAL

## 2024-02-22 VITALS
RESPIRATION RATE: 18 BRPM | OXYGEN SATURATION: 99 % | TEMPERATURE: 97.9 F | WEIGHT: 163.14 LBS | HEIGHT: 63 IN | SYSTOLIC BLOOD PRESSURE: 123 MMHG | BODY MASS INDEX: 28.91 KG/M2 | HEART RATE: 76 BPM | DIASTOLIC BLOOD PRESSURE: 78 MMHG

## 2024-02-22 DIAGNOSIS — R89.9 ABNORMAL LABORATORY TEST RESULT: ICD-10-CM

## 2024-02-22 DIAGNOSIS — N93.9 ABNORMAL UTERINE BLEEDING (AUB): ICD-10-CM

## 2024-02-22 DIAGNOSIS — Z01.818 PREPROCEDURAL EXAMINATION: ICD-10-CM

## 2024-02-22 DIAGNOSIS — R89.9 ABNORMAL LABORATORY TEST RESULT: Primary | ICD-10-CM

## 2024-02-22 LAB
ABO GROUP (TYPE) IN BLOOD: NORMAL
ANION GAP SERPL CALC-SCNC: 11 MMOL/L (ref 10–20)
ANTIBODY SCREEN: NORMAL
BASOPHILS # BLD AUTO: 0.04 X10*3/UL (ref 0–0.1)
BASOPHILS NFR BLD AUTO: 0.8 %
BUN SERPL-MCNC: 22 MG/DL (ref 6–23)
CALCIUM SERPL-MCNC: 9.1 MG/DL (ref 8.6–10.3)
CHLORIDE SERPL-SCNC: 105 MMOL/L (ref 98–107)
CO2 SERPL-SCNC: 25 MMOL/L (ref 21–32)
CREAT SERPL-MCNC: 1.17 MG/DL (ref 0.5–1.05)
EGFRCR SERPLBLD CKD-EPI 2021: 59 ML/MIN/1.73M*2
EOSINOPHIL # BLD AUTO: 0.1 X10*3/UL (ref 0–0.7)
EOSINOPHIL NFR BLD AUTO: 1.9 %
ERYTHROCYTE [DISTWIDTH] IN BLOOD BY AUTOMATED COUNT: 18.4 % (ref 11.5–14.5)
GLUCOSE SERPL-MCNC: 85 MG/DL (ref 74–99)
HCG UR QL IA.RAPID: NEGATIVE
HCT VFR BLD AUTO: 36 % (ref 36–46)
HGB BLD-MCNC: 11.1 G/DL (ref 12–16)
IMM GRANULOCYTES # BLD AUTO: 0.01 X10*3/UL (ref 0–0.7)
IMM GRANULOCYTES NFR BLD AUTO: 0.2 % (ref 0–0.9)
LYMPHOCYTES # BLD AUTO: 1.48 X10*3/UL (ref 1.2–4.8)
LYMPHOCYTES NFR BLD AUTO: 28.6 %
MCH RBC QN AUTO: 25.7 PG (ref 26–34)
MCHC RBC AUTO-ENTMCNC: 30.8 G/DL (ref 32–36)
MCV RBC AUTO: 83 FL (ref 80–100)
MONOCYTES # BLD AUTO: 0.35 X10*3/UL (ref 0.1–1)
MONOCYTES NFR BLD AUTO: 6.8 %
NEUTROPHILS # BLD AUTO: 3.19 X10*3/UL (ref 1.2–7.7)
NEUTROPHILS NFR BLD AUTO: 61.7 %
NRBC BLD-RTO: 0 /100 WBCS (ref 0–0)
PLATELET # BLD AUTO: 348 X10*3/UL (ref 150–450)
POTASSIUM SERPL-SCNC: 4.3 MMOL/L (ref 3.5–5.3)
RBC # BLD AUTO: 4.32 X10*6/UL (ref 4–5.2)
RH FACTOR (ANTIGEN D): NORMAL
SODIUM SERPL-SCNC: 137 MMOL/L (ref 136–145)
WBC # BLD AUTO: 5.2 X10*3/UL (ref 4.4–11.3)

## 2024-02-22 PROCEDURE — 86901 BLOOD TYPING SEROLOGIC RH(D): CPT

## 2024-02-22 PROCEDURE — 81025 URINE PREGNANCY TEST: CPT

## 2024-02-22 PROCEDURE — 99204 OFFICE O/P NEW MOD 45 MIN: CPT | Performed by: NURSE PRACTITIONER

## 2024-02-22 PROCEDURE — 86900 BLOOD TYPING SEROLOGIC ABO: CPT

## 2024-02-22 PROCEDURE — 80048 BASIC METABOLIC PNL TOTAL CA: CPT

## 2024-02-22 PROCEDURE — 86850 RBC ANTIBODY SCREEN: CPT

## 2024-02-22 PROCEDURE — 85025 COMPLETE CBC W/AUTO DIFF WBC: CPT

## 2024-02-22 PROCEDURE — 36415 COLL VENOUS BLD VENIPUNCTURE: CPT

## 2024-02-22 PROCEDURE — 87081 CULTURE SCREEN ONLY: CPT | Mod: AHULAB | Performed by: NURSE PRACTITIONER

## 2024-02-22 RX ORDER — CHLORHEXIDINE GLUCONATE ORAL RINSE 1.2 MG/ML
15 SOLUTION DENTAL DAILY
Qty: 30 ML | Refills: 0 | Status: SHIPPED | OUTPATIENT
Start: 2024-02-22 | End: 2024-02-29 | Stop reason: HOSPADM

## 2024-02-22 ASSESSMENT — ENCOUNTER SYMPTOMS
RESPIRATORY NEGATIVE: 1
GASTROINTESTINAL NEGATIVE: 1
NECK NEGATIVE: 1
MUSCULOSKELETAL NEGATIVE: 1
CONSTITUTIONAL NEGATIVE: 1
CARDIOVASCULAR NEGATIVE: 1

## 2024-02-22 NOTE — PREPROCEDURE INSTRUCTIONS
Medication List            Accurate as of February 22, 2024  9:19 AM. Always use your most recent med list.                aspirin-acetaminophen-caffeine 250-250-65 mg tablet  Commonly known as: Excedrin Migraine  Medication Adjustments for Surgery: Stop 7 days before surgery     chlorhexidine 0.12 % solution  Commonly known as: Peridex  Use 15 mL in the mouth or throat once daily for 2 days.  Notes to patient: Use as instructed      cholecalciferol 25 MCG (1000 UT) capsule  Commonly known as: Vitamin D-3  Medication Adjustments for Surgery: Continue until night before surgery     ferrous sulfate 325 (65 Fe) MG EC tablet  Take 1 tablet (325 mg) by mouth once daily with a meal. Do not crush, chew, or split.  Medication Adjustments for Surgery: Continue until night before surgery     norethindrone 5 mg tablet  Commonly known as: Aygestin  Take 2 tablets (10 mg) by mouth once daily.  Medication Adjustments for Surgery: Take morning of surgery with sip of water, no other fluids     OREGANO OIL ORAL  Medication Adjustments for Surgery: Stop 7 days before surgery                 CONTACT SURGEON'S OFFICE IF YOU DEVELOP:  * Fever = 100.4 F   * New respiratory symptoms (e.g. cough, shortness of breath, respiratory distress, sore throat)  * Recent loss of taste or smell  *Flu like symptoms such as headache, fatigue or gastrointestinal symptoms  * You develop any open sores, shingles, burning or painful urination   AND/OR:  * You no longer wish to have the surgery.  * Any other personal circumstances change that may lead to the need to cancel or defer this surgery.  *You were admitted to any hospital within one week of your planned procedure.    SMOKING:  *Quitting smoking can make a huge difference to your health and recovery from surgery.    *If you need help with quitting, call 2-014-QUIT-NOW.    THE DAY BEFORE SURGERY:  *Do not eat any food after midnight the night before surgery.   *You are permitted to drink clear  liquids (i.e. water, black coffee, tea, clear broth, apple juice) up to 2 hours before your surgery.  DIABETICS:  Please check fasting blood sugar  upon waking up.  If fasting sugar is <80 mg/dl, please drink 100ml/3oz of apple juice no later than 2 hours prior to surgery.      SURGICAL TIME  *You will be contacted between 2 p.m. and 6 p.m. the business day before your surgery with your arrival time.  *If you haven't received a call by 6pm, call [phone].  *Scheduled surgery times may change and you will be notified if this occurs-check your personal voicemail for any updates.    ON THE MORNING OF SURGERY:  *Wear comfortable, loose fitting clothing.   *Do not use moisturizers, creams, lotions or perfume.  *All jewelry and valuables should be left at home.  *Prosthetic devices such as contact lenses, hearing aids, dentures, eyelash extensions, hairpins and body piercing must be removed before surgery.    BRING WITH YOU:  *Photo ID and insurance card  *Current list of medicines and allergies  *Pacemaker/Defibrillator/Heart stent cards  *CPAP machine and mask  *Slings/splints/crutches  *Copy of your complete Advanced Directive/DHPOA-if applicable  *Neurostimulator implant remote    PARKING AND ARRIVAL:  *Check in at the Main Entrance desk and let them know you are here for surgery.  *You will be directed to the 2nd floor surgical waiting area.    AFTER OUTPATIENT SURGERY:  *A responsible adult MUST accompany you at the time of discharge and stay with you for 24 hours after your surgery.  *You may NOT drive yourself home after surgery.  *You may use a taxi or ride sharing service ([employer], Uber) to return home ONLY if you are accompanied by a friend or family member.  *Instructions for resuming your medications will be provided by your surgeon.      YOU HAVE REVIEWED THE MEDICATIONS ON THIS SHEET AND YOU VERIFY THESE ARE ALL THE MEDICATIONS AND OVER THE COUNTER MEDICATIONS THAT YOU TAKE .

## 2024-02-22 NOTE — CPM/PAT H&P
CPM/PAT Evaluation       Name: Gabriela Cabrera (Gabriela Cabrera)  /Age: 1980/44 y.o.     SURGEON :DR REGAN YEBOAH     Surgery, Date, and Length:  Total Abdominal Hysterectomy , 24  HPI:  This a 44 y.o. fe-male who presents for presurgical evaluation for  for above mentioned procedure.Pt reports abnormal uterine bleeding.She was seen in the ER for excessive bleeding with clots .   . After discussion of the risks and benefits with Dr. Yeboah the patient elects to proceed with the planned procedure.       Past Medical History:   Diagnosis Date    Abnormal uterine bleeding     Allergic rhinitis     Insomnia     Iron deficiency anemia due to chronic blood loss     Vitamin D deficiency        Past Surgical History:   Procedure Laterality Date    DILATION AND CURETTAGE OF UTERUS      HERNIA REPAIR     Anesthesia History  Pt denies any past history of anesthetic complications such as PONV, awareness, prolonged sedation, dental damage, aspiration, cardiac arrest, difficult intubation, difficult I.V. access or unexpected hospital admissions.  NO malignant hyperthermia and or pseudo cholinesterase deficiency.    The patient is not  a [facility] and will accept blood and blood products if medically indicated.   No history of blood transfusions .Type and screen  sent.     Social History  Social History     Substance and Sexual Activity   Drug Use Never      Social History     Substance and Sexual Activity   Alcohol Use Yes    Alcohol/week: 4.0 standard drinks of alcohol    Types: 4 Glasses of wine per week      Social History     Tobacco Use   Smoking Status Never   Smokeless Tobacco Never        Family History   Problem Relation Name Age of Onset    Transient ischemic attack Mother      No Known Problems Father      Asthma Sister      No Known Problems Brother         No Known Allergies    Prior to Admission medications    Medication Sig Start Date End Date Taking? Authorizing  Provider   aspirin-acetaminophen-caffeine (Excedrin Migraine) 250-250-65 mg tablet Take 1 tablet by mouth every 8 hours if needed for headaches.    Historical Provider, MD   chlorhexidine (Peridex) 0.12 % solution Use 15 mL in the mouth or throat once daily for 2 days. 2/22/24 2/24/24  Leigh Ann GELLER BESSIE Cee-CNP   cholecalciferol (Vitamin D-3) 25 MCG (1000 UT) capsule Take by mouth. 6/24/21   Historical Provider, MD   ferrous sulfate 325 (65 Fe) MG EC tablet Take 1 tablet (325 mg) by mouth once daily with a meal. Do not crush, chew, or split. 11/17/23 11/16/24  Berhane Yeboah MD   norethindrone (Aygestin) 5 mg tablet Take 2 tablets (10 mg) by mouth once daily. 11/30/23 11/29/24  Berhane Yeboah MD   OREGANO OIL ORAL Take 1 Dose by mouth once daily.    Historical Provider, MD GONZALEZ ROS:   Constitutional:   neg    Neuro/Psych:   Eyes:   Ears:   Nose:   neg    Mouth:   neg    Throat:   neg    Neck:   neg    Cardio:   neg    Respiratory:   neg    Endocrine:   GI:   neg    :   neg    Musculoskeletal:   neg    Hematologic:   neg    Skin:  neg        Physical Exam  Vitals reviewed.   Constitutional:       Appearance: Normal appearance.   HENT:      Head: Normocephalic.      Mouth/Throat:      Mouth: Mucous membranes are moist.   Eyes:      Extraocular Movements: Extraocular movements intact.      Pupils: Pupils are equal, round, and reactive to light.   Cardiovascular:      Rate and Rhythm: Normal rate and regular rhythm.      Pulses: Normal pulses.      Heart sounds: Normal heart sounds.   Pulmonary:      Effort: Pulmonary effort is normal.      Breath sounds: Normal breath sounds.   Musculoskeletal:         General: Normal range of motion.      Cervical back: Normal range of motion.   Skin:     General: Skin is warm.   Neurological:      Mental Status: She is alert and oriented to person, place, and time.   Psychiatric:         Mood and Affect: Mood normal.         Behavior: Behavior normal.       "    PAT AIRWAY:   Airway:     Mallampati::  II  normal    /78   Pulse 76   Temp 36.6 °C (97.9 °F)   Resp 18   Ht 1.6 m (5' 2.99\")   Wt 74 kg (163 lb 2.3 oz)   SpO2 99%   BMI 28.91 kg/m²     Lab Results   Component Value Date    WBC 5.2 02/22/2024    HGB 11.1 (L) 02/22/2024    HCT 36.0 02/22/2024    MCV 83 02/22/2024     02/22/2024     Lab Results   Component Value Date    GLUCOSE 85 02/22/2024    CALCIUM 9.1 02/22/2024     02/22/2024    K 4.3 02/22/2024    CO2 25 02/22/2024     02/22/2024    BUN 22 02/22/2024    CREATININE 1.17 (H) 02/22/2024       ASSESSMENT/PLAN    Patient is a 44year-old  scheduled for Total Abdominal Hysterectomy  with Dr. Yeboah   on  2/27/24 .  CARDIOVASCULAR:  RCRI score / Risk: The patients score is 0 based on history . Per ACC/AHA guidelines this places her  at  3.9% risk for MACE undergoing a intermediate  risk procedure . The patient has the following risk factors:  Functional Capacity: The patients exercise tolerance is  4  METS. This is based on the patients abililty to ascend a flight of stairs  and walk 2 block w/o chest pain or other anginal equivalents.  . Patient denies  active cardiac symptoms or anginal equivalents .      PULMONARY:  The patient has the following factors that place them at increased risk of perioperative pulmonary complications;BMI greater than 27/greater than 2. hour procedure.  Postoperatively the patient would benefit from early pulmonary toilet/incentive spirometry q 1-2 hours while awake/pulse oximetry/cautious use of respiratory depressant medications such as opioids/elevate the HOB/oral hygiene.      DVT:  CAPRINI SCORE=6  The patient has the following factors that increase her Risk for thrombus formation ; Virchow's triad ,age>44,bmi>25 , Surgical procedure >2 hrs  procedure .    Recommendations: DVT prophylaxis  per Dr. YEBOAH  protocol . SCD's, HERMAN's, and early ambulation are recommended. Heparin or LMWH is recommended " for the very high risk .      Risk assessment complete.  Patient is scheduled for  intermediate  surgical risk procedure.  Patient is considered an acceptable risk to proceed with the planned procedure.      Preoperative medication instructions were provided and reviewed with the patient.  Any additional testing or evaluation was explained to the patient.  Nothing by mouth instructions were discussed and patient's questions were answered prior to conclusion to this encounter.  Patient verbalized understanding of preoperative instructions given in preadmission testing; discharge instructions available in EMR.

## 2024-02-24 LAB — STAPHYLOCOCCUS SPEC CULT: NORMAL

## 2024-02-26 ENCOUNTER — ANESTHESIA EVENT (OUTPATIENT)
Dept: OPERATING ROOM | Facility: HOSPITAL | Age: 44
DRG: 743 | End: 2024-02-26
Payer: COMMERCIAL

## 2024-02-27 ENCOUNTER — ANESTHESIA (OUTPATIENT)
Dept: OPERATING ROOM | Facility: HOSPITAL | Age: 44
DRG: 743 | End: 2024-02-27
Payer: COMMERCIAL

## 2024-02-27 ENCOUNTER — HOSPITAL ENCOUNTER (INPATIENT)
Facility: HOSPITAL | Age: 44
LOS: 2 days | Discharge: HOME | DRG: 743 | End: 2024-02-29
Attending: STUDENT IN AN ORGANIZED HEALTH CARE EDUCATION/TRAINING PROGRAM | Admitting: STUDENT IN AN ORGANIZED HEALTH CARE EDUCATION/TRAINING PROGRAM
Payer: COMMERCIAL

## 2024-02-27 ENCOUNTER — TELEPHONE (OUTPATIENT)
Dept: OBSTETRICS AND GYNECOLOGY | Facility: CLINIC | Age: 44
End: 2024-02-27
Payer: COMMERCIAL

## 2024-02-27 DIAGNOSIS — D50.0 IRON DEFICIENCY ANEMIA DUE TO CHRONIC BLOOD LOSS: ICD-10-CM

## 2024-02-27 DIAGNOSIS — N93.9 ABNORMAL UTERINE BLEEDING (AUB): Primary | ICD-10-CM

## 2024-02-27 DIAGNOSIS — Z90.710 H/O ABDOMINAL HYSTERECTOMY: ICD-10-CM

## 2024-02-27 LAB — PREGNANCY TEST URINE, POC: NEGATIVE

## 2024-02-27 PROCEDURE — 58150 TOTAL HYSTERECTOMY: CPT | Performed by: STUDENT IN AN ORGANIZED HEALTH CARE EDUCATION/TRAINING PROGRAM

## 2024-02-27 PROCEDURE — 0UT90ZZ RESECTION OF UTERUS, OPEN APPROACH: ICD-10-PCS | Performed by: STUDENT IN AN ORGANIZED HEALTH CARE EDUCATION/TRAINING PROGRAM

## 2024-02-27 PROCEDURE — 2500000005 HC RX 250 GENERAL PHARMACY W/O HCPCS

## 2024-02-27 PROCEDURE — 3600000004 HC OR TIME - INITIAL BASE CHARGE - PROCEDURE LEVEL FOUR: Performed by: STUDENT IN AN ORGANIZED HEALTH CARE EDUCATION/TRAINING PROGRAM

## 2024-02-27 PROCEDURE — 2500000001 HC RX 250 WO HCPCS SELF ADMINISTERED DRUGS (ALT 637 FOR MEDICARE OP): Performed by: STUDENT IN AN ORGANIZED HEALTH CARE EDUCATION/TRAINING PROGRAM

## 2024-02-27 PROCEDURE — 2500000004 HC RX 250 GENERAL PHARMACY W/ HCPCS (ALT 636 FOR OP/ED): Performed by: ANESTHESIOLOGY

## 2024-02-27 PROCEDURE — 1100000001 HC PRIVATE ROOM DAILY

## 2024-02-27 PROCEDURE — A6213 FOAM DRG >16<=48 SQ IN W/BDR: HCPCS | Performed by: STUDENT IN AN ORGANIZED HEALTH CARE EDUCATION/TRAINING PROGRAM

## 2024-02-27 PROCEDURE — 2500000004 HC RX 250 GENERAL PHARMACY W/ HCPCS (ALT 636 FOR OP/ED): Performed by: STUDENT IN AN ORGANIZED HEALTH CARE EDUCATION/TRAINING PROGRAM

## 2024-02-27 PROCEDURE — A58150 PR TOTAL ABDOM HYSTERECTOMY: Performed by: NURSE ANESTHETIST, CERTIFIED REGISTERED

## 2024-02-27 PROCEDURE — 3700000002 HC GENERAL ANESTHESIA TIME - EACH INCREMENTAL 1 MINUTE: Performed by: STUDENT IN AN ORGANIZED HEALTH CARE EDUCATION/TRAINING PROGRAM

## 2024-02-27 PROCEDURE — 88307 TISSUE EXAM BY PATHOLOGIST: CPT | Mod: TC,AHULAB | Performed by: STUDENT IN AN ORGANIZED HEALTH CARE EDUCATION/TRAINING PROGRAM

## 2024-02-27 PROCEDURE — 7100000002 HC RECOVERY ROOM TIME - EACH INCREMENTAL 1 MINUTE: Performed by: STUDENT IN AN ORGANIZED HEALTH CARE EDUCATION/TRAINING PROGRAM

## 2024-02-27 PROCEDURE — 2500000005 HC RX 250 GENERAL PHARMACY W/O HCPCS: Performed by: NURSE ANESTHETIST, CERTIFIED REGISTERED

## 2024-02-27 PROCEDURE — 3600000009 HC OR TIME - EACH INCREMENTAL 1 MINUTE - PROCEDURE LEVEL FOUR: Performed by: STUDENT IN AN ORGANIZED HEALTH CARE EDUCATION/TRAINING PROGRAM

## 2024-02-27 PROCEDURE — 7100000001 HC RECOVERY ROOM TIME - INITIAL BASE CHARGE: Performed by: STUDENT IN AN ORGANIZED HEALTH CARE EDUCATION/TRAINING PROGRAM

## 2024-02-27 PROCEDURE — A58150 PR TOTAL ABDOM HYSTERECTOMY: Performed by: STUDENT IN AN ORGANIZED HEALTH CARE EDUCATION/TRAINING PROGRAM

## 2024-02-27 PROCEDURE — 88307 TISSUE EXAM BY PATHOLOGIST: CPT | Performed by: PATHOLOGY

## 2024-02-27 PROCEDURE — 0UT70ZZ RESECTION OF BILATERAL FALLOPIAN TUBES, OPEN APPROACH: ICD-10-PCS | Performed by: STUDENT IN AN ORGANIZED HEALTH CARE EDUCATION/TRAINING PROGRAM

## 2024-02-27 PROCEDURE — 2720000007 HC OR 272 NO HCPCS: Performed by: STUDENT IN AN ORGANIZED HEALTH CARE EDUCATION/TRAINING PROGRAM

## 2024-02-27 PROCEDURE — C1760 CLOSURE DEV, VASC: HCPCS | Performed by: STUDENT IN AN ORGANIZED HEALTH CARE EDUCATION/TRAINING PROGRAM

## 2024-02-27 PROCEDURE — G0378 HOSPITAL OBSERVATION PER HR: HCPCS

## 2024-02-27 PROCEDURE — 2500000004 HC RX 250 GENERAL PHARMACY W/ HCPCS (ALT 636 FOR OP/ED): Performed by: NURSE ANESTHETIST, CERTIFIED REGISTERED

## 2024-02-27 PROCEDURE — 3700000001 HC GENERAL ANESTHESIA TIME - INITIAL BASE CHARGE: Performed by: STUDENT IN AN ORGANIZED HEALTH CARE EDUCATION/TRAINING PROGRAM

## 2024-02-27 RX ORDER — ROCURONIUM BROMIDE 10 MG/ML
INJECTION, SOLUTION INTRAVENOUS AS NEEDED
Status: DISCONTINUED | OUTPATIENT
Start: 2024-02-27 | End: 2024-02-27

## 2024-02-27 RX ORDER — AMOXICILLIN 250 MG
2 CAPSULE ORAL 2 TIMES DAILY
Status: DISCONTINUED | OUTPATIENT
Start: 2024-02-27 | End: 2024-02-29 | Stop reason: HOSPADM

## 2024-02-27 RX ORDER — ACETAMINOPHEN 325 MG/1
975 TABLET ORAL EVERY 6 HOURS
Status: DISCONTINUED | OUTPATIENT
Start: 2024-02-27 | End: 2024-02-29 | Stop reason: HOSPADM

## 2024-02-27 RX ORDER — PHENYLEPHRINE HCL IN 0.9% NACL 1 MG/10 ML
SYRINGE (ML) INTRAVENOUS AS NEEDED
Status: DISCONTINUED | OUTPATIENT
Start: 2024-02-27 | End: 2024-02-27

## 2024-02-27 RX ORDER — NORETHINDRONE AND ETHINYL ESTRADIOL 0.5-0.035
KIT ORAL AS NEEDED
Status: DISCONTINUED | OUTPATIENT
Start: 2024-02-27 | End: 2024-02-27

## 2024-02-27 RX ORDER — LIDOCAINE HYDROCHLORIDE 10 MG/ML
0.1 INJECTION, SOLUTION EPIDURAL; INFILTRATION; INTRACAUDAL; PERINEURAL ONCE
Status: DISCONTINUED | OUTPATIENT
Start: 2024-02-27 | End: 2024-02-27 | Stop reason: HOSPADM

## 2024-02-27 RX ORDER — IBUPROFEN 600 MG/1
600 TABLET ORAL EVERY 6 HOURS
Status: DISCONTINUED | OUTPATIENT
Start: 2024-02-28 | End: 2024-02-29 | Stop reason: HOSPADM

## 2024-02-27 RX ORDER — SODIUM CHLORIDE, SODIUM LACTATE, POTASSIUM CHLORIDE, CALCIUM CHLORIDE 600; 310; 30; 20 MG/100ML; MG/100ML; MG/100ML; MG/100ML
100 INJECTION, SOLUTION INTRAVENOUS CONTINUOUS
Status: DISCONTINUED | OUTPATIENT
Start: 2024-02-27 | End: 2024-02-27 | Stop reason: HOSPADM

## 2024-02-27 RX ORDER — NALOXONE HYDROCHLORIDE 0.4 MG/ML
0.1 INJECTION, SOLUTION INTRAMUSCULAR; INTRAVENOUS; SUBCUTANEOUS EVERY 5 MIN PRN
Status: DISCONTINUED | OUTPATIENT
Start: 2024-02-27 | End: 2024-02-29 | Stop reason: HOSPADM

## 2024-02-27 RX ORDER — DEXAMETHASONE SODIUM PHOSPHATE 4 MG/ML
INJECTION, SOLUTION INTRA-ARTICULAR; INTRALESIONAL; INTRAMUSCULAR; INTRAVENOUS; SOFT TISSUE AS NEEDED
Status: DISCONTINUED | OUTPATIENT
Start: 2024-02-27 | End: 2024-02-27

## 2024-02-27 RX ORDER — GABAPENTIN 300 MG/1
600 CAPSULE ORAL ONCE
Status: COMPLETED | OUTPATIENT
Start: 2024-02-27 | End: 2024-02-27

## 2024-02-27 RX ORDER — OXYCODONE HYDROCHLORIDE 5 MG/1
5 TABLET ORAL EVERY 4 HOURS PRN
Status: DISCONTINUED | OUTPATIENT
Start: 2024-02-27 | End: 2024-02-27 | Stop reason: HOSPADM

## 2024-02-27 RX ORDER — ONDANSETRON HYDROCHLORIDE 2 MG/ML
4 INJECTION, SOLUTION INTRAVENOUS EVERY 6 HOURS PRN
Status: DISCONTINUED | OUTPATIENT
Start: 2024-02-27 | End: 2024-02-29 | Stop reason: HOSPADM

## 2024-02-27 RX ORDER — METRONIDAZOLE 500 MG/100ML
INJECTION, SOLUTION INTRAVENOUS AS NEEDED
Status: DISCONTINUED | OUTPATIENT
Start: 2024-02-27 | End: 2024-02-27

## 2024-02-27 RX ORDER — SODIUM CHLORIDE, SODIUM LACTATE, POTASSIUM CHLORIDE, CALCIUM CHLORIDE 600; 310; 30; 20 MG/100ML; MG/100ML; MG/100ML; MG/100ML
40 INJECTION, SOLUTION INTRAVENOUS CONTINUOUS
Status: DISCONTINUED | OUTPATIENT
Start: 2024-02-27 | End: 2024-02-28

## 2024-02-27 RX ORDER — ATROPINE SULFATE 0.1 MG/ML
INJECTION INTRAVENOUS AS NEEDED
Status: DISCONTINUED | OUTPATIENT
Start: 2024-02-27 | End: 2024-02-27

## 2024-02-27 RX ORDER — OXYCODONE HYDROCHLORIDE 5 MG/1
10 TABLET ORAL EVERY 4 HOURS PRN
Status: DISCONTINUED | OUTPATIENT
Start: 2024-02-27 | End: 2024-02-29 | Stop reason: HOSPADM

## 2024-02-27 RX ORDER — MEPERIDINE HYDROCHLORIDE 25 MG/ML
12.5 INJECTION INTRAMUSCULAR; INTRAVENOUS; SUBCUTANEOUS EVERY 10 MIN PRN
Status: DISCONTINUED | OUTPATIENT
Start: 2024-02-27 | End: 2024-02-27 | Stop reason: HOSPADM

## 2024-02-27 RX ORDER — SIMETHICONE 80 MG
80 TABLET,CHEWABLE ORAL 4 TIMES DAILY PRN
Status: DISCONTINUED | OUTPATIENT
Start: 2024-02-27 | End: 2024-02-29 | Stop reason: HOSPADM

## 2024-02-27 RX ORDER — MIDAZOLAM HYDROCHLORIDE 1 MG/ML
INJECTION INTRAMUSCULAR; INTRAVENOUS AS NEEDED
Status: DISCONTINUED | OUTPATIENT
Start: 2024-02-27 | End: 2024-02-27

## 2024-02-27 RX ORDER — HYDROMORPHONE HYDROCHLORIDE 1 MG/ML
INJECTION, SOLUTION INTRAMUSCULAR; INTRAVENOUS; SUBCUTANEOUS AS NEEDED
Status: DISCONTINUED | OUTPATIENT
Start: 2024-02-27 | End: 2024-02-27

## 2024-02-27 RX ORDER — METOCLOPRAMIDE HYDROCHLORIDE 5 MG/ML
10 INJECTION INTRAMUSCULAR; INTRAVENOUS EVERY 6 HOURS PRN
Status: DISCONTINUED | OUTPATIENT
Start: 2024-02-27 | End: 2024-02-29 | Stop reason: HOSPADM

## 2024-02-27 RX ORDER — CEFAZOLIN 1 G/1
INJECTION, POWDER, FOR SOLUTION INTRAVENOUS AS NEEDED
Status: DISCONTINUED | OUTPATIENT
Start: 2024-02-27 | End: 2024-02-27

## 2024-02-27 RX ORDER — ONDANSETRON HYDROCHLORIDE 2 MG/ML
INJECTION, SOLUTION INTRAVENOUS AS NEEDED
Status: DISCONTINUED | OUTPATIENT
Start: 2024-02-27 | End: 2024-02-27

## 2024-02-27 RX ORDER — KETOROLAC TROMETHAMINE 30 MG/ML
30 INJECTION, SOLUTION INTRAMUSCULAR; INTRAVENOUS EVERY 6 HOURS
Status: COMPLETED | OUTPATIENT
Start: 2024-02-27 | End: 2024-02-28

## 2024-02-27 RX ORDER — ACETAMINOPHEN 325 MG/1
975 TABLET ORAL ONCE
Status: COMPLETED | OUTPATIENT
Start: 2024-02-27 | End: 2024-02-27

## 2024-02-27 RX ORDER — PROPOFOL 10 MG/ML
INJECTION, EMULSION INTRAVENOUS AS NEEDED
Status: DISCONTINUED | OUTPATIENT
Start: 2024-02-27 | End: 2024-02-27

## 2024-02-27 RX ORDER — OXYCODONE HYDROCHLORIDE 5 MG/1
5 TABLET ORAL EVERY 4 HOURS PRN
Status: DISCONTINUED | OUTPATIENT
Start: 2024-02-27 | End: 2024-02-29 | Stop reason: HOSPADM

## 2024-02-27 RX ORDER — POLYETHYLENE GLYCOL 3350 17 G/17G
17 POWDER, FOR SOLUTION ORAL DAILY
Status: DISCONTINUED | OUTPATIENT
Start: 2024-02-27 | End: 2024-02-29 | Stop reason: HOSPADM

## 2024-02-27 RX ORDER — CELECOXIB 200 MG/1
400 CAPSULE ORAL ONCE
Status: COMPLETED | OUTPATIENT
Start: 2024-02-27 | End: 2024-02-27

## 2024-02-27 RX ORDER — ONDANSETRON HYDROCHLORIDE 2 MG/ML
4 INJECTION, SOLUTION INTRAVENOUS ONCE AS NEEDED
Status: DISCONTINUED | OUTPATIENT
Start: 2024-02-27 | End: 2024-02-27 | Stop reason: HOSPADM

## 2024-02-27 RX ORDER — FENTANYL CITRATE 50 UG/ML
INJECTION, SOLUTION INTRAMUSCULAR; INTRAVENOUS AS NEEDED
Status: DISCONTINUED | OUTPATIENT
Start: 2024-02-27 | End: 2024-02-27

## 2024-02-27 RX ORDER — ENOXAPARIN SODIUM 100 MG/ML
40 INJECTION SUBCUTANEOUS EVERY 24 HOURS
Status: DISCONTINUED | OUTPATIENT
Start: 2024-02-28 | End: 2024-02-29 | Stop reason: HOSPADM

## 2024-02-27 RX ORDER — SODIUM CHLORIDE, SODIUM LACTATE, POTASSIUM CHLORIDE, CALCIUM CHLORIDE 600; 310; 30; 20 MG/100ML; MG/100ML; MG/100ML; MG/100ML
100 INJECTION, SOLUTION INTRAVENOUS CONTINUOUS
Status: DISCONTINUED | OUTPATIENT
Start: 2024-02-27 | End: 2024-02-27

## 2024-02-27 RX ADMIN — KETOROLAC TROMETHAMINE 30 MG: 30 INJECTION, SOLUTION INTRAMUSCULAR at 23:55

## 2024-02-27 RX ADMIN — MIDAZOLAM HYDROCHLORIDE 2 MG: 1 INJECTION, SOLUTION INTRAMUSCULAR; INTRAVENOUS at 12:35

## 2024-02-27 RX ADMIN — HYDROMORPHONE HYDROCHLORIDE 0.4 MG: 1 INJECTION, SOLUTION INTRAMUSCULAR; INTRAVENOUS; SUBCUTANEOUS at 23:00

## 2024-02-27 RX ADMIN — ACETAMINOPHEN 975 MG: 325 TABLET ORAL at 23:55

## 2024-02-27 RX ADMIN — ROCURONIUM BROMIDE 10 MG: 10 INJECTION, SOLUTION INTRAVENOUS at 15:18

## 2024-02-27 RX ADMIN — ONDANSETRON 4 MG: 2 INJECTION INTRAMUSCULAR; INTRAVENOUS at 13:06

## 2024-02-27 RX ADMIN — SUGAMMADEX 200 MG: 100 INJECTION, SOLUTION INTRAVENOUS at 15:51

## 2024-02-27 RX ADMIN — SENNOSIDES AND DOCUSATE SODIUM 2 TABLET: 8.6; 5 TABLET ORAL at 20:21

## 2024-02-27 RX ADMIN — SODIUM CHLORIDE, POTASSIUM CHLORIDE, SODIUM LACTATE AND CALCIUM CHLORIDE 40 ML/HR: 600; 310; 30; 20 INJECTION, SOLUTION INTRAVENOUS at 18:19

## 2024-02-27 RX ADMIN — GABAPENTIN 600 MG: 300 CAPSULE ORAL at 10:58

## 2024-02-27 RX ADMIN — ROCURONIUM BROMIDE 50 MG: 10 INJECTION, SOLUTION INTRAVENOUS at 12:43

## 2024-02-27 RX ADMIN — CEFAZOLIN 2 G: 1 INJECTION, POWDER, FOR SOLUTION INTRAMUSCULAR; INTRAVENOUS at 12:41

## 2024-02-27 RX ADMIN — Medication 100 MCG: at 13:39

## 2024-02-27 RX ADMIN — SODIUM CHLORIDE, POTASSIUM CHLORIDE, SODIUM LACTATE AND CALCIUM CHLORIDE 100 ML/HR: 600; 310; 30; 20 INJECTION, SOLUTION INTRAVENOUS at 11:03

## 2024-02-27 RX ADMIN — ACETAMINOPHEN 975 MG: 325 TABLET ORAL at 10:55

## 2024-02-27 RX ADMIN — POLYETHYLENE GLYCOL 3350 17 G: 17 POWDER, FOR SOLUTION ORAL at 18:19

## 2024-02-27 RX ADMIN — ROCURONIUM BROMIDE 10 MG: 10 INJECTION, SOLUTION INTRAVENOUS at 13:06

## 2024-02-27 RX ADMIN — ATROPINE SULFATE 0.2 MG: 0.1 INJECTION INTRAVENOUS at 13:30

## 2024-02-27 RX ADMIN — OXYCODONE HYDROCHLORIDE 10 MG: 5 TABLET ORAL at 20:21

## 2024-02-27 RX ADMIN — HYDROMORPHONE HYDROCHLORIDE 0.5 MG: 1 INJECTION, SOLUTION INTRAMUSCULAR; INTRAVENOUS; SUBCUTANEOUS at 16:13

## 2024-02-27 RX ADMIN — DEXAMETHASONE SODIUM PHOSPHATE 4 MG: 4 INJECTION, SOLUTION INTRAMUSCULAR; INTRAVENOUS at 12:43

## 2024-02-27 RX ADMIN — METRONIDAZOLE 500 MG: 500 INJECTION, SOLUTION INTRAVENOUS at 12:41

## 2024-02-27 RX ADMIN — ROCURONIUM BROMIDE 10 MG: 10 INJECTION, SOLUTION INTRAVENOUS at 13:29

## 2024-02-27 RX ADMIN — HYDROMORPHONE HYDROCHLORIDE 0.5 MG: 1 INJECTION, SOLUTION INTRAMUSCULAR; INTRAVENOUS; SUBCUTANEOUS at 14:04

## 2024-02-27 RX ADMIN — HYDROMORPHONE HYDROCHLORIDE 0.5 MG: 1 INJECTION, SOLUTION INTRAMUSCULAR; INTRAVENOUS; SUBCUTANEOUS at 16:32

## 2024-02-27 RX ADMIN — Medication 100 MCG: at 14:05

## 2024-02-27 RX ADMIN — ROCURONIUM BROMIDE 10 MG: 10 INJECTION, SOLUTION INTRAVENOUS at 14:57

## 2024-02-27 RX ADMIN — FENTANYL CITRATE 100 MCG: 50 INJECTION, SOLUTION INTRAMUSCULAR; INTRAVENOUS at 12:43

## 2024-02-27 RX ADMIN — CELECOXIB 400 MG: 200 CAPSULE ORAL at 10:58

## 2024-02-27 RX ADMIN — ROCURONIUM BROMIDE 10 MG: 10 INJECTION, SOLUTION INTRAVENOUS at 14:10

## 2024-02-27 RX ADMIN — EPHEDRINE SULFATE 5 MG: 50 INJECTION, SOLUTION INTRAVENOUS at 13:25

## 2024-02-27 RX ADMIN — SODIUM CHLORIDE, POTASSIUM CHLORIDE, SODIUM LACTATE AND CALCIUM CHLORIDE: 600; 310; 30; 20 INJECTION, SOLUTION INTRAVENOUS at 14:25

## 2024-02-27 RX ADMIN — ACETAMINOPHEN 975 MG: 325 TABLET ORAL at 18:18

## 2024-02-27 RX ADMIN — KETOROLAC TROMETHAMINE 30 MG: 30 INJECTION, SOLUTION INTRAMUSCULAR at 18:18

## 2024-02-27 RX ADMIN — Medication 100 MCG: at 13:36

## 2024-02-27 RX ADMIN — ONDANSETRON 4 MG: 2 INJECTION INTRAMUSCULAR; INTRAVENOUS at 23:00

## 2024-02-27 RX ADMIN — HYDROMORPHONE HYDROCHLORIDE 0.5 MG: 1 INJECTION, SOLUTION INTRAMUSCULAR; INTRAVENOUS; SUBCUTANEOUS at 14:25

## 2024-02-27 RX ADMIN — PROPOFOL 170 MG: 10 INJECTION, EMULSION INTRAVENOUS at 12:43

## 2024-02-27 SDOH — SOCIAL STABILITY: SOCIAL INSECURITY: HAVE YOU HAD THOUGHTS OF HARMING ANYONE ELSE?: NO

## 2024-02-27 SDOH — SOCIAL STABILITY: SOCIAL INSECURITY: DOES ANYONE TRY TO KEEP YOU FROM HAVING/CONTACTING OTHER FRIENDS OR DOING THINGS OUTSIDE YOUR HOME?: NO

## 2024-02-27 SDOH — SOCIAL STABILITY: SOCIAL INSECURITY: DO YOU FEEL ANYONE HAS EXPLOITED OR TAKEN ADVANTAGE OF YOU FINANCIALLY OR OF YOUR PERSONAL PROPERTY?: NO

## 2024-02-27 SDOH — SOCIAL STABILITY: SOCIAL INSECURITY: ARE THERE ANY APPARENT SIGNS OF INJURIES/BEHAVIORS THAT COULD BE RELATED TO ABUSE/NEGLECT?: NO

## 2024-02-27 SDOH — SOCIAL STABILITY: SOCIAL INSECURITY: HAS ANYONE EVER THREATENED TO HURT YOUR FAMILY OR YOUR PETS?: NO

## 2024-02-27 SDOH — SOCIAL STABILITY: SOCIAL INSECURITY: DO YOU FEEL UNSAFE GOING BACK TO THE PLACE WHERE YOU ARE LIVING?: NO

## 2024-02-27 SDOH — SOCIAL STABILITY: SOCIAL INSECURITY: ARE YOU OR HAVE YOU BEEN THREATENED OR ABUSED PHYSICALLY, EMOTIONALLY, OR SEXUALLY BY ANYONE?: NO

## 2024-02-27 SDOH — SOCIAL STABILITY: SOCIAL INSECURITY: ABUSE: ADULT

## 2024-02-27 SDOH — SOCIAL STABILITY: SOCIAL INSECURITY: WERE YOU ABLE TO COMPLETE ALL THE BEHAVIORAL HEALTH SCREENINGS?: NO

## 2024-02-27 ASSESSMENT — COGNITIVE AND FUNCTIONAL STATUS - GENERAL
DAILY ACTIVITIY SCORE: 14
DRESSING REGULAR LOWER BODY CLOTHING: A LOT
CLIMB 3 TO 5 STEPS WITH RAILING: A LOT
PATIENT BASELINE BEDBOUND: NO
TURNING FROM BACK TO SIDE WHILE IN FLAT BAD: A LITTLE
MOVING TO AND FROM BED TO CHAIR: A LOT
PERSONAL GROOMING: A LITTLE
HELP NEEDED FOR BATHING: A LOT
EATING MEALS: A LITTLE
DRESSING REGULAR UPPER BODY CLOTHING: A LOT
TOILETING: A LOT
MOBILITY SCORE: 14
MOVING FROM LYING ON BACK TO SITTING ON SIDE OF FLAT BED WITH BEDRAILS: A LITTLE
WALKING IN HOSPITAL ROOM: A LOT
STANDING UP FROM CHAIR USING ARMS: A LOT

## 2024-02-27 ASSESSMENT — LIFESTYLE VARIABLES
HOW OFTEN DO YOU HAVE 6 OR MORE DRINKS ON ONE OCCASION: PATIENT DECLINED
HOW MANY STANDARD DRINKS CONTAINING ALCOHOL DO YOU HAVE ON A TYPICAL DAY: PATIENT DECLINED

## 2024-02-27 ASSESSMENT — ACTIVITIES OF DAILY LIVING (ADL)
FEEDING YOURSELF: INDEPENDENT
ADEQUATE_TO_COMPLETE_ADL: YES
WALKS IN HOME: INDEPENDENT
TOILETING: INDEPENDENT
PATIENT'S MEMORY ADEQUATE TO SAFELY COMPLETE DAILY ACTIVITIES?: YES
LACK_OF_TRANSPORTATION: PATIENT DECLINED
JUDGMENT_ADEQUATE_SAFELY_COMPLETE_DAILY_ACTIVITIES: YES
HEARING - RIGHT EAR: FUNCTIONAL
GROOMING: INDEPENDENT
DRESSING YOURSELF: INDEPENDENT
BATHING: INDEPENDENT
HEARING - LEFT EAR: FUNCTIONAL

## 2024-02-27 ASSESSMENT — PAIN - FUNCTIONAL ASSESSMENT
PAIN_FUNCTIONAL_ASSESSMENT: 0-10

## 2024-02-27 ASSESSMENT — PAIN SCALES - GENERAL
PAINLEVEL_OUTOF10: 0 - NO PAIN
PAINLEVEL_OUTOF10: 0 - NO PAIN
PAINLEVEL_OUTOF10: 2
PAINLEVEL_OUTOF10: 9
PAINLEVEL_OUTOF10: 0 - NO PAIN
PAINLEVEL_OUTOF10: 0 - NO PAIN
PAINLEVEL_OUTOF10: 8
PAINLEVEL_OUTOF10: 4
PAINLEVEL_OUTOF10: 7
PAINLEVEL_OUTOF10: 8
PAINLEVEL_OUTOF10: 0 - NO PAIN
PAINLEVEL_OUTOF10: 4
PAINLEVEL_OUTOF10: 4
PAINLEVEL_OUTOF10: 0 - NO PAIN
PAINLEVEL_OUTOF10: 9
PAINLEVEL_OUTOF10: 7

## 2024-02-27 ASSESSMENT — COLUMBIA-SUICIDE SEVERITY RATING SCALE - C-SSRS
6. HAVE YOU EVER DONE ANYTHING, STARTED TO DO ANYTHING, OR PREPARED TO DO ANYTHING TO END YOUR LIFE?: NO
2. HAVE YOU ACTUALLY HAD ANY THOUGHTS OF KILLING YOURSELF?: NO
1. IN THE PAST MONTH, HAVE YOU WISHED YOU WERE DEAD OR WISHED YOU COULD GO TO SLEEP AND NOT WAKE UP?: NO

## 2024-02-27 ASSESSMENT — PAIN DESCRIPTION - LOCATION: LOCATION: ABDOMEN

## 2024-02-27 NOTE — TELEPHONE ENCOUNTER
Late Entry from 2024 Telephone Encounter  RN called and verified Patient by Name and   RN reviewed upcoming procedure with Patient scheduled on 24, Total Abdominal Hysterectomy   RN reviewed with Patient that she will get a call the day before surgery to tell her what time surgery will be and PreAdmission testing will be calling her to schedule a call   RN educated that she will need transportation to and from the procedure, not to eat or drink past midnight besides sips of water with maintenance medication in the morning and post op warning signs of when to call the office  2 week post op appt already scheduled, RN scheduled 6 week post op appt  Patient verbalizes understanding  FMLA paperwork has been submitted  Roseanne De La Rosa RN

## 2024-02-27 NOTE — PERIOPERATIVE NURSING NOTE
1602: Patient to bay with anesthesia present, plan of care reviewed. Report received from DIAMOND MENDOZA. Initial assessment complete.    1613: 0.5mg IV dilaudid given per order for pain. Pt family updated via text.    1615: pt tolerating sips of water.    1632: 0.5mg IV dilaudid given per order for pain.    1650: pt appears to be resting comfortably at this time.    1700: pt appears to be resting comfortably.    1710: pt family updated via phone call to surgery reception.

## 2024-02-27 NOTE — OP NOTE
Date: 2024  OR Location: [facility] OR    Name: Gabriela Cabrera, : 1980, Age: 44 y.o., MRN: [id number], Sex: female    Diagnosis  Pre-op Diagnosis     * Abnormal uterine bleeding (AUB) [N93.9]     * Iron deficiency anemia due to chronic blood loss [D50.0] Post-op Diagnosis     * Abnormal uterine bleeding (AUB) [N93.9]     * Iron deficiency anemia due to chronic blood loss [D50.0]     Procedures  Total Abdominal Hysterectomy  58795 - UT TOTAL ABDOMINAL HYSTERECT W/WO RMVL TUBE OVARY    Total abdominal hysterectomy  Bilateral salpingectomy    Surgeons      * Berhane Yeboah - Primary    Resident/Fellow/Other Assistant:  Surgeon(s) and Role:  Jonny Wasserman MD PGY4 Assisting    Procedure Summary  Anesthesia: General  ASA: II  Anesthesia Staff: Anesthesiologist: Pablo Laboy MD; Berhane Denney MD  CRNA: BESSIE Ramirez-CRNA  C-AA: GOPI Mckeon  Estimated Blood Loss: 150 mL  Intra-op Medications:   Administrations occurring from 1200 to 1430 on 24:   Medication Name Total Dose   lactated Ringer's infusion 8.33 mL              Anesthesia Record               Intraprocedure I/O Totals          Intake    lactated Ringer's infusion 1300.00 mL    Total Intake 1300 mL       Output    Urine 300 mL    Total Blood Loss - Surgical Delivery (mL) 150 mL    Total Output 450 mL       Net    Net Volume 850 mL       Other (could not be determined as input or output)    Surgical Delivery Estimated Blood Loss (mL) 150          Specimen:   ID Type Source Tests Collected by Time   1 : UTERUS, CERVIX AND BILATERAL FALLOPIAN TUBES Tissue UTERUS, CERVIX, AND BILATERAL FALLOPIAN TUBES SURGICAL PATHOLOGY EXAM Berhane Yeboah MD 2024 1498        Staff:   Circulator: Raul Doran RN; Brielle Tinajero, RN  Relief Circulator: Jennifer Marsh RN  Relief Scrub: Jessica Montejo; Krysten Andrade  Scrub Person: Nava Rodriguez RN; Alfreda Holland    Findings:     Enlarged fibroid uterus with  normal appearing fallopian tubes and ovaries. All incision lines were hemostatic at the close of the case     Procedure Details:  Consent was completed in pre-op holding area after discussing all risks/benefits/alternatives.     Pt was taken to OR with IV in place. Team huddle and timeout were performed with all appropriate team members.  Anesthesia was induced without difficulty. Pt was placed in dorsal lithotomy position in rosa stirrups and prepped and draped in the usual sterile fashion. A gastelum catheter was placed.     New sterile gloves were donned and attention was turned to the abdomen.  A Pfannenstiel skin incision was made and carried through to the fascia. The fascial layer was incised and extended bilaterally.The peritoneum was entered sharply and the incision was extended bluntly with good visualization of the bladder. The abdomen was then explored with findings noted above.Shawn self-retaining retractor was placed, the bowel was then packed with moist laparotomy sponges to expose the pelvic viscera, and the patient was trach placed in Trendelenburg.    Attention was first turned to the patient's right side. The round ligament was doubly suture ligated and then transected. The anterior and posterior leaves of the broad ligaments were opened. An avascular plane was identified inferior to the right uteroovarian ligament. A window was created through this plane and the uteroovarian ligament was doubly clamped, cut, and suture ligated. The same procedure was performed on the contralateral side.  Vesicouterine peritoneum was identified elevated and incised transversely for the development of the bladder  Which was then dissected off of the lower uterine segment and cervix bluntly.  The bilateral uterine arteries were then skeletonized.  The posterior peritoneum was skeletonized and dropped away from the posterior uterus with cautery. The uterine vessels were then clamped at the level of the cervix,  transected and suture ligated. The cardinal and uterosacaral ligaments were then sequentially clamped, cut and suture ligated. The vaginal cuff was then clamped at the level of the external cervical os and the uterus and cervix were amputated and handed off the field as one specimen. The vaginal cuff was closed using 0 Vicryl suture with Rochelle stitches at the angles and figure-of-eight stitches medially.  Bleeding from the peritoneal incision overlying the right uterine artery complex was then addressed using a figure-of-eight stitch to reapproximate the peritoneum.  Hemostasis was observed along all surgical pedicles.    The abdomen and pelvis were thoroughly irrigated with sterile water. Operative sites were again examined and found to be hemostatic.    The left fallopian tube was identified and elevated off underlying mesosalpinx with Chantal clamps. The mesosalpinx was clamped, cut, and suture ligated and the left fallopian tube transected and handed off the field for pathology. The same procedure was repeated on the right.    Operative sites were examined and found to be hemostatic. Hasmukh absorbable hemostatic powder was placed in pelvis over vaginal cuff.      The fascia was closed with a running stitch of 0-Vicryl. The skin was closed with 4-0 Monocryl. The incision was dressed with steri strips and covered with Mepilex dressing.    All laps and instruments were removed and sponge, needle and instrument counts were correct X 2.  Dr. Yeboah was scrubbed and actively participated in the entirety of the procedure.    The patient was awoken from anesthesia without difficulty and was taken to PACU in stable condition.      Complications:  None; patient tolerated the procedure well.     Disposition: PACU - hemodynamically stable.  Condition: stable    Attending Attestation: I was present and scrubbed for the entire procedure.

## 2024-02-27 NOTE — ANESTHESIA PROCEDURE NOTES
Airway  Date/Time: 2/27/2024 12:45 PM  Urgency: elective    Airway not difficult    Staffing  Performed: CRNA and attending   Authorized by: Pablo Laboy MD    Performed by: BESSIE Ramirez-KATY  Patient location during procedure: OR    Indications and Patient Condition  Indications for airway management: anesthesia  Sedation level: deep  Preoxygenated: yes  Patient position: sniffing  Mask difficulty assessment: 1 - vent by mask    Final Airway Details  Final airway type: endotracheal airway      Successful airway: ETT  Cuffed: yes   Successful intubation technique: direct laryngoscopy  Facilitating devices/methods: intubating stylet  Endotracheal tube insertion site: oral  Blade: Diego  Blade size: #3  ETT size (mm): 7.0  Cormack-Lehane Classification: grade I - full view of glottis  Placement verified by: chest auscultation and capnometry   Measured from: lips  ETT to lips (cm): 21

## 2024-02-27 NOTE — ANESTHESIA PREPROCEDURE EVALUATION
Patient: Gabriela Cabrera    Procedure Information       Date/Time: 02/27/24 1200    Procedure: Total Abdominal Hysterectomy - *Dr. Samson to assist*    Location: [facility] OR  / [facility] OR    Surgeons: Berhane Yeboah MD            Relevant Problems   Anesthesia (within normal limits)      Cardiovascular (within normal limits)      /Renal (within normal limits)      Neuro/Psych  insomnia      Pulmonary  Env allergies      Hematology   (+) ADAM (iron deficiency anemia)      Infectious Disease   (+) Candida vaginitis       Clinical information reviewed:   Tobacco  Allergies  Meds   Med Hx  Surg Hx  OB Status  Fam Hx  Soc   Hx        NPO Detail:  NPO/Void Status  Carbohydrate Drink Given Prior to Surgery? : N  Date of Last Liquid: 02/27/24  Time of Last Liquid: 0755  Date of Last Solid: 02/26/24  Time of Last Solid: 1830  Last Intake Type: Clear fluids  Time of Last Void: 1027         Physical Exam    Airway  Mallampati: II     Cardiovascular    Dental    Pulmonary    Abdominal            Anesthesia Plan    History of general anesthesia?: yes  History of complications of general anesthesia?: no    ASA 2     general     intravenous induction   Anesthetic plan and risks discussed with patient.

## 2024-02-27 NOTE — ANESTHESIA POSTPROCEDURE EVALUATION
Patient: Gabriela Cabrera    Procedure Summary       Date: 02/27/24 Room / Location: [facility] OR 02 / [facility]    Anesthesia Start: 1236 Anesthesia Stop: 1604    Procedure: Total Abdominal Hysterectomy Diagnosis:       Abnormal uterine bleeding (AUB)      Iron deficiency anemia due to chronic blood loss      (Abnormal uterine bleeding (AUB) [N93.9])      (Iron deficiency anemia due to chronic blood loss [D50.0])    Surgeons: Berhane Yeboah MD Responsible Provider: Berhane Denney MD    Anesthesia Type: general ASA Status: 2            Anesthesia Type: general    Vitals Value Taken Time   /83 02/27/24 1716   Temp 36.5 °C (97.7 °F) 02/27/24 1700   Pulse 80 02/27/24 1724   Resp 14 02/27/24 1700   SpO2 96 % 02/27/24 1724   Vitals shown include unvalidated device data.    Anesthesia Post Evaluation    Patient location during evaluation: bedside  Patient participation: complete - patient participated  Level of consciousness: awake  Pain management: adequate  Multimodal analgesia pain management approach  Airway patency: patent  Cardiovascular status: stable  Respiratory status: spontaneous ventilation and unassisted  Hydration status: acceptable  Postoperative Nausea and Vomiting: none  Comments: No significant PONV.        No notable events documented.

## 2024-02-28 ENCOUNTER — APPOINTMENT (OUTPATIENT)
Dept: CARDIOLOGY | Facility: HOSPITAL | Age: 44
DRG: 743 | End: 2024-02-28
Payer: COMMERCIAL

## 2024-02-28 LAB
ANION GAP SERPL CALC-SCNC: 12 MMOL/L (ref 10–20)
BUN SERPL-MCNC: 16 MG/DL (ref 6–23)
CALCIUM SERPL-MCNC: 8.2 MG/DL (ref 8.6–10.3)
CHLORIDE SERPL-SCNC: 103 MMOL/L (ref 98–107)
CO2 SERPL-SCNC: 22 MMOL/L (ref 21–32)
CREAT SERPL-MCNC: 1.12 MG/DL (ref 0.5–1.05)
EGFRCR SERPLBLD CKD-EPI 2021: 62 ML/MIN/1.73M*2
ERYTHROCYTE [DISTWIDTH] IN BLOOD BY AUTOMATED COUNT: 17.6 % (ref 11.5–14.5)
GLUCOSE SERPL-MCNC: 143 MG/DL (ref 74–99)
HCT VFR BLD AUTO: 28.8 % (ref 36–46)
HGB BLD-MCNC: 9.8 G/DL (ref 12–16)
MCH RBC QN AUTO: 26.2 PG (ref 26–34)
MCHC RBC AUTO-ENTMCNC: 34 G/DL (ref 32–36)
MCV RBC AUTO: 77 FL (ref 80–100)
NRBC BLD-RTO: 0 /100 WBCS (ref 0–0)
PLATELET # BLD AUTO: 309 X10*3/UL (ref 150–450)
POTASSIUM SERPL-SCNC: 4 MMOL/L (ref 3.5–5.3)
RBC # BLD AUTO: 3.74 X10*6/UL (ref 4–5.2)
SODIUM SERPL-SCNC: 133 MMOL/L (ref 136–145)
WBC # BLD AUTO: 11.7 X10*3/UL (ref 4.4–11.3)

## 2024-02-28 PROCEDURE — 58150 TOTAL HYSTERECTOMY: CPT | Performed by: STUDENT IN AN ORGANIZED HEALTH CARE EDUCATION/TRAINING PROGRAM

## 2024-02-28 PROCEDURE — 80048 BASIC METABOLIC PNL TOTAL CA: CPT | Performed by: STUDENT IN AN ORGANIZED HEALTH CARE EDUCATION/TRAINING PROGRAM

## 2024-02-28 PROCEDURE — 2500000001 HC RX 250 WO HCPCS SELF ADMINISTERED DRUGS (ALT 637 FOR MEDICARE OP): Performed by: STUDENT IN AN ORGANIZED HEALTH CARE EDUCATION/TRAINING PROGRAM

## 2024-02-28 PROCEDURE — 2500000004 HC RX 250 GENERAL PHARMACY W/ HCPCS (ALT 636 FOR OP/ED): Performed by: STUDENT IN AN ORGANIZED HEALTH CARE EDUCATION/TRAINING PROGRAM

## 2024-02-28 PROCEDURE — 1100000001 HC PRIVATE ROOM DAILY

## 2024-02-28 PROCEDURE — 85027 COMPLETE CBC AUTOMATED: CPT | Performed by: STUDENT IN AN ORGANIZED HEALTH CARE EDUCATION/TRAINING PROGRAM

## 2024-02-28 PROCEDURE — 36415 COLL VENOUS BLD VENIPUNCTURE: CPT | Performed by: STUDENT IN AN ORGANIZED HEALTH CARE EDUCATION/TRAINING PROGRAM

## 2024-02-28 PROCEDURE — 93005 ELECTROCARDIOGRAM TRACING: CPT

## 2024-02-28 RX ADMIN — ACETAMINOPHEN 975 MG: 325 TABLET ORAL at 06:24

## 2024-02-28 RX ADMIN — IBUPROFEN 600 MG: 600 TABLET, FILM COATED ORAL at 17:35

## 2024-02-28 RX ADMIN — OXYCODONE HYDROCHLORIDE 5 MG: 5 TABLET ORAL at 01:38

## 2024-02-28 RX ADMIN — SENNOSIDES AND DOCUSATE SODIUM 2 TABLET: 8.6; 5 TABLET ORAL at 08:08

## 2024-02-28 RX ADMIN — KETOROLAC TROMETHAMINE 30 MG: 30 INJECTION, SOLUTION INTRAMUSCULAR at 06:24

## 2024-02-28 RX ADMIN — ENOXAPARIN SODIUM 40 MG: 40 INJECTION SUBCUTANEOUS at 16:00

## 2024-02-28 RX ADMIN — POLYETHYLENE GLYCOL 3350 17 G: 17 POWDER, FOR SOLUTION ORAL at 08:08

## 2024-02-28 RX ADMIN — OXYCODONE HYDROCHLORIDE 10 MG: 5 TABLET ORAL at 13:21

## 2024-02-28 RX ADMIN — KETOROLAC TROMETHAMINE 30 MG: 30 INJECTION, SOLUTION INTRAMUSCULAR at 12:24

## 2024-02-28 RX ADMIN — ACETAMINOPHEN 975 MG: 325 TABLET ORAL at 12:25

## 2024-02-28 RX ADMIN — OXYCODONE HYDROCHLORIDE 10 MG: 5 TABLET ORAL at 22:04

## 2024-02-28 RX ADMIN — SODIUM CHLORIDE, POTASSIUM CHLORIDE, SODIUM LACTATE AND CALCIUM CHLORIDE 40 ML/HR: 600; 310; 30; 20 INJECTION, SOLUTION INTRAVENOUS at 04:13

## 2024-02-28 SDOH — ECONOMIC STABILITY: HOUSING INSECURITY
IN THE LAST 12 MONTHS, WAS THERE A TIME WHEN YOU DID NOT HAVE A STEADY PLACE TO SLEEP OR SLEPT IN A SHELTER (INCLUDING NOW)?: NO

## 2024-02-28 SDOH — ECONOMIC STABILITY: TRANSPORTATION INSECURITY
IN THE PAST 12 MONTHS, HAS LACK OF TRANSPORTATION KEPT YOU FROM MEETINGS, WORK, OR FROM GETTING THINGS NEEDED FOR DAILY LIVING?: NO

## 2024-02-28 SDOH — ECONOMIC STABILITY: INCOME INSECURITY: IN THE PAST 12 MONTHS, HAS THE ELECTRIC, GAS, OIL, OR WATER COMPANY THREATENED TO SHUT OFF SERVICE IN YOUR HOME?: NO

## 2024-02-28 SDOH — SOCIAL STABILITY: SOCIAL INSECURITY: WITHIN THE LAST YEAR, HAVE YOU BEEN AFRAID OF YOUR PARTNER OR EX-PARTNER?: NO

## 2024-02-28 SDOH — SOCIAL STABILITY: SOCIAL INSECURITY: WITHIN THE LAST YEAR, HAVE YOU BEEN HUMILIATED OR EMOTIONALLY ABUSED IN OTHER WAYS BY YOUR PARTNER OR EX-PARTNER?: NO

## 2024-02-28 SDOH — SOCIAL STABILITY: SOCIAL NETWORK: ARE YOU MARRIED, WIDOWED, DIVORCED, SEPARATED, NEVER MARRIED, OR LIVING WITH A PARTNER?: DIVORCED

## 2024-02-28 SDOH — HEALTH STABILITY: MENTAL HEALTH: HOW MANY STANDARD DRINKS CONTAINING ALCOHOL DO YOU HAVE ON A TYPICAL DAY?: 1 OR 2

## 2024-02-28 SDOH — ECONOMIC STABILITY: FOOD INSECURITY: WITHIN THE PAST 12 MONTHS, YOU WORRIED THAT YOUR FOOD WOULD RUN OUT BEFORE YOU GOT MONEY TO BUY MORE.: NEVER TRUE

## 2024-02-28 SDOH — SOCIAL STABILITY: SOCIAL INSECURITY
WITHIN THE LAST YEAR, HAVE TO BEEN RAPED OR FORCED TO HAVE ANY KIND OF SEXUAL ACTIVITY BY YOUR PARTNER OR EX-PARTNER?: NO

## 2024-02-28 SDOH — ECONOMIC STABILITY: INCOME INSECURITY: IN THE LAST 12 MONTHS, WAS THERE A TIME WHEN YOU WERE NOT ABLE TO PAY THE MORTGAGE OR RENT ON TIME?: NO

## 2024-02-28 SDOH — ECONOMIC STABILITY: FOOD INSECURITY: WITHIN THE PAST 12 MONTHS, THE FOOD YOU BOUGHT JUST DIDN'T LAST AND YOU DIDN'T HAVE MONEY TO GET MORE.: NEVER TRUE

## 2024-02-28 SDOH — ECONOMIC STABILITY: INCOME INSECURITY: HOW HARD IS IT FOR YOU TO PAY FOR THE VERY BASICS LIKE FOOD, HOUSING, MEDICAL CARE, AND HEATING?: NOT HARD AT ALL

## 2024-02-28 SDOH — HEALTH STABILITY: MENTAL HEALTH: HOW OFTEN DO YOU HAVE 6 OR MORE DRINKS ON ONE OCCASION?: NEVER

## 2024-02-28 SDOH — ECONOMIC STABILITY: HOUSING INSECURITY: IN THE LAST 12 MONTHS, HOW MANY PLACES HAVE YOU LIVED?: 1

## 2024-02-28 SDOH — SOCIAL STABILITY: SOCIAL INSECURITY
WITHIN THE LAST YEAR, HAVE YOU BEEN KICKED, HIT, SLAPPED, OR OTHERWISE PHYSICALLY HURT BY YOUR PARTNER OR EX-PARTNER?: NO

## 2024-02-28 SDOH — ECONOMIC STABILITY: TRANSPORTATION INSECURITY
IN THE PAST 12 MONTHS, HAS THE LACK OF TRANSPORTATION KEPT YOU FROM MEDICAL APPOINTMENTS OR FROM GETTING MEDICATIONS?: NO

## 2024-02-28 SDOH — HEALTH STABILITY: MENTAL HEALTH: HOW OFTEN DO YOU HAVE A DRINK CONTAINING ALCOHOL?: 2-4 TIMES A MONTH

## 2024-02-28 ASSESSMENT — PAIN SCALES - GENERAL
PAINLEVEL_OUTOF10: 4
PAINLEVEL_OUTOF10: 5 - MODERATE PAIN
PAINLEVEL_OUTOF10: 7
PAINLEVEL_OUTOF10: 2
PAINLEVEL_OUTOF10: 2
PAINLEVEL_OUTOF10: 5 - MODERATE PAIN
PAINLEVEL_OUTOF10: 2
PAINLEVEL_OUTOF10: 2
PAINLEVEL_OUTOF10: 3
PAINLEVEL_OUTOF10: 3
PAINLEVEL_OUTOF10: 7
PAINLEVEL_OUTOF10: 3

## 2024-02-28 ASSESSMENT — PAIN - FUNCTIONAL ASSESSMENT
PAIN_FUNCTIONAL_ASSESSMENT: 0-10

## 2024-02-28 ASSESSMENT — PAIN DESCRIPTION - LOCATION
LOCATION: ABDOMEN

## 2024-02-28 ASSESSMENT — LIFESTYLE VARIABLES
AUDIT-C TOTAL SCORE: 2
SKIP TO QUESTIONS 9-10: 1

## 2024-02-28 ASSESSMENT — COGNITIVE AND FUNCTIONAL STATUS - GENERAL
DRESSING REGULAR UPPER BODY CLOTHING: A LITTLE
STANDING UP FROM CHAIR USING ARMS: A LITTLE
PERSONAL GROOMING: A LITTLE
DRESSING REGULAR UPPER BODY CLOTHING: A LITTLE
MOVING TO AND FROM BED TO CHAIR: A LITTLE
CLIMB 3 TO 5 STEPS WITH RAILING: A LOT
MOVING FROM LYING ON BACK TO SITTING ON SIDE OF FLAT BED WITH BEDRAILS: A LITTLE
WALKING IN HOSPITAL ROOM: A LOT
DRESSING REGULAR LOWER BODY CLOTHING: A LITTLE
TOILETING: A LITTLE
HELP NEEDED FOR BATHING: A LITTLE
WALKING IN HOSPITAL ROOM: A LOT
TURNING FROM BACK TO SIDE WHILE IN FLAT BAD: A LITTLE
MOVING FROM LYING ON BACK TO SITTING ON SIDE OF FLAT BED WITH BEDRAILS: A LITTLE
TOILETING: A LITTLE
MOBILITY SCORE: 16
MOVING TO AND FROM BED TO CHAIR: A LITTLE
TURNING FROM BACK TO SIDE WHILE IN FLAT BAD: A LITTLE
PERSONAL GROOMING: A LITTLE
HELP NEEDED FOR BATHING: A LITTLE
STANDING UP FROM CHAIR USING ARMS: A LITTLE
DAILY ACTIVITIY SCORE: 19
MOBILITY SCORE: 16
CLIMB 3 TO 5 STEPS WITH RAILING: A LOT
DAILY ACTIVITIY SCORE: 19
DRESSING REGULAR LOWER BODY CLOTHING: A LITTLE

## 2024-02-28 ASSESSMENT — PAIN DESCRIPTION - ORIENTATION
ORIENTATION: LOWER

## 2024-02-28 ASSESSMENT — ACTIVITIES OF DAILY LIVING (ADL): LACK_OF_TRANSPORTATION: NO

## 2024-02-28 NOTE — PROGRESS NOTES
02/28/24 1102   Discharge Planning   Living Arrangements Children   Support Systems Children   Assistance Needed none   Type of Residence Private residence   Number of Stairs to Enter Residence 4   Number of Stairs Within Residence 0   Do you have animals or pets at home? No   Home or Post Acute Services None   Patient expects to be discharged to: Home   Does the patient need discharge transport arranged? No   Financial Resource Strain   How hard is it for you to pay for the very basics like food, housing, medical care, and heating? Not hard   Housing Stability   In the last 12 months, was there a time when you were not able to pay the mortgage or rent on time? N   In the last 12 months, how many places have you lived? 1   In the last 12 months, was there a time when you did not have a steady place to sleep or slept in a shelter (including now)? N   Transportation Needs   In the past 12 months, has lack of transportation kept you from medical appointments or from getting medications? no   In the past 12 months, has lack of transportation kept you from meetings, work, or from getting things needed for daily living? No     2/28/24 1107  Met with patient at bedside to discuss discharge planning.  She lives at home with her adult daughter and minor son in an apartment.  It is on the first floor.  She is independent with ADLs and drives at baseline.  She does not use any assistive devices for ambulation.  She plans on returning home at discharge.  She does not anticipate any discharge needs.  She will notify the [facility] should any needs arise.  Taryn Trejo RN [facility]

## 2024-02-28 NOTE — CARE PLAN
The patient's goals for the shift include      The clinical goals for the shift include patient to be discharged    Over this shift patient was not discharged she will stay one more night for pain management and will be discharged tomorrow.

## 2024-02-28 NOTE — CARE PLAN
Problem: Skin  Goal: Decreased wound size/increased tissue granulation at next dressing change  Outcome: Progressing  Flowsheets (Taken 2/27/2024 2214)  Decreased wound size/increased tissue granulation at next dressing change: Promote sleep for wound healing  Goal: Participates in plan/prevention/treatment measures  Outcome: Progressing  Flowsheets (Taken 2/27/2024 2214)  Participates in plan/prevention/treatment measures: Elevate heels  Goal: Prevent/manage excess moisture  Outcome: Progressing  Flowsheets (Taken 2/27/2024 2214)  Prevent/manage excess moisture: Monitor for/manage infection if present  Goal: Prevent/minimize sheer/friction injuries  Outcome: Progressing  Flowsheets (Taken 2/27/2024 2214)  Prevent/minimize sheer/friction injuries: Increase activity/out of bed for meals  Goal: Promote/optimize nutrition  Outcome: Progressing  Flowsheets (Taken 2/27/2024 2214)  Promote/optimize nutrition: Monitor/record intake including meals  Goal: Promote skin healing  Outcome: Progressing  Flowsheets (Taken 2/27/2024 2214)  Promote skin healing: Turn/reposition every 2 hours/use positioning/transfer devices     Problem: Fall/Injury  Goal: Not fall by end of shift  Outcome: Progressing  Goal: Be free from injury by end of the shift  Outcome: Progressing  Goal: Verbalize understanding of personal risk factors for fall in the hospital  Outcome: Progressing  Goal: Verbalize understanding of risk factor reduction measures to prevent injury from fall in the home  Outcome: Progressing  Goal: Use assistive devices by end of the shift  Outcome: Progressing  Goal: Pace activities to prevent fatigue by end of the shift  Outcome: Progressing

## 2024-02-28 NOTE — CARE PLAN
The patient's goals for the shift include      The clinical goals for the shift include Patient will be from pain throughout shift      Problem: Skin  Goal: Decreased wound size/increased tissue granulation at next dressing change  Outcome: Progressing  Goal: Participates in plan/prevention/treatment measures  Outcome: Progressing  Goal: Prevent/manage excess moisture  Outcome: Progressing  Goal: Prevent/minimize sheer/friction injuries  Outcome: Progressing  Goal: Promote/optimize nutrition  Outcome: Progressing  Goal: Promote skin healing  Outcome: Progressing     Problem: Fall/Injury  Goal: Not fall by end of shift  Outcome: Progressing  Goal: Be free from injury by end of the shift  Outcome: Progressing  Goal: Verbalize understanding of personal risk factors for fall in the hospital  Outcome: Progressing  Goal: Verbalize understanding of risk factor reduction measures to prevent injury from fall in the home  Outcome: Progressing  Goal: Use assistive devices by end of the shift  Outcome: Progressing  Goal: Pace activities to prevent fatigue by end of the shift  Outcome: Progressing

## 2024-02-28 NOTE — PROGRESS NOTES
"Gabriela Cabrera is a 44 y.o. female on day 1 of admission presenting with Abnormal uterine bleeding (AUB).    Subjective   No acute complaints.  Pain is not well-controlled.  Patient tolerated bites of dinner and was able to eat her full breakfast this morning.  Patient is ambulating to the bathroom voiding freely.  She is having no heavy vaginal bleeding.  She has not been passing flatus.       Objective     Physical Exam  Vitals reviewed.   Constitutional:       General: She is not in acute distress.     Appearance: She is not ill-appearing.   Pulmonary:      Effort: Pulmonary effort is normal.   Abdominal:      General: There is distension.      Palpations: Abdomen is soft.      Tenderness: There is abdominal tenderness. There is no guarding or rebound.      Comments: Mild distention and tenderness consistent with postoperative state.   Musculoskeletal:      Right lower leg: No edema.      Left lower leg: No edema.   Skin:     Coloration: Skin is not pale.   Neurological:      Mental Status: She is alert.         Last Recorded Vitals  Blood pressure 115/72, pulse 91, temperature 36.7 °C (98 °F), temperature source Oral, resp. rate 16, height 1.6 m (5' 3\"), weight 75.9 kg (167 lb 5.3 oz), last menstrual period 02/27/2024, SpO2 99 %.  Intake/Output last 3 Shifts:  I/O last 3 completed shifts:  In: 1662.7 (21.9 mL/kg) [I.V.:1662.7 (21.9 mL/kg)]  Out: 1200 (15.8 mL/kg) [Urine:1050 (0.4 mL/kg/hr); Blood:150]  Weight: 75.9 kg     Relevant Results            Scheduled medications  acetaminophen, 975 mg, oral, q6h  enoxaparin, 40 mg, subcutaneous, q24h  ibuprofen, 600 mg, oral, q6h  ketorolac, 30 mg, intravenous, q6h  polyethylene glycol, 17 g, oral, Daily  sennosides-docusate sodium, 2 tablet, oral, BID      Continuous medications     PRN medications  PRN medications: HYDROmorphone, metoclopramide, naloxone, ondansetron, oxyCODONE, oxyCODONE, simethicone  Results for orders placed or performed during the " hospital encounter of 02/27/24 (from the past 96 hour(s))   POCT pregnancy, urine manually resulted   Result Value Ref Range    Preg Test, Ur Negative Negative   CBC   Result Value Ref Range    WBC 11.7 (H) 4.4 - 11.3 x10*3/uL    nRBC 0.0 0.0 - 0.0 /100 WBCs    RBC 3.74 (L) 4.00 - 5.20 x10*6/uL    Hemoglobin 9.8 (L) 12.0 - 16.0 g/dL    Hematocrit 28.8 (L) 36.0 - 46.0 %    MCV 77 (L) 80 - 100 fL    MCH 26.2 26.0 - 34.0 pg    MCHC 34.0 32.0 - 36.0 g/dL    RDW 17.6 (H) 11.5 - 14.5 %    Platelets 309 150 - 450 x10*3/uL   Basic Metabolic Panel   Result Value Ref Range    Glucose 143 (H) 74 - 99 mg/dL    Sodium 133 (L) 136 - 145 mmol/L    Potassium 4.0 3.5 - 5.3 mmol/L    Chloride 103 98 - 107 mmol/L    Bicarbonate 22 21 - 32 mmol/L    Anion Gap 12 10 - 20 mmol/L    Urea Nitrogen 16 6 - 23 mg/dL    Creatinine 1.12 (H) 0.50 - 1.05 mg/dL    eGFR 62 >60 mL/min/1.73m*2    Calcium 8.2 (L) 8.6 - 10.3 mg/dL                  Assessment/Plan   Principal Problem:    Abnormal uterine bleeding (AUB)  Active Problems:    ADAM (iron deficiency anemia)    H/O abdominal hysterectomy    Patient is now postop day 1 status post complicated total abdominal hysterectomy and bilateral salpingectomy.  EBL was 150.  Patient is progressing per routine but is not yet passed flatus.  Objectively vital signs are stable and patient is afebrile.  Physical exam is benign though notable for moderate distention.  Hemoglobin is stable postoperatively.  Will continue to monitor patient in-house and ensure pain control.     Will DC fluids and encourage ambulation today.       I spent 15 minutes in the professional and overall care of this patient.      Berhane Yeboah MD

## 2024-02-29 ENCOUNTER — TELEPHONE (OUTPATIENT)
Dept: OBSTETRICS AND GYNECOLOGY | Facility: CLINIC | Age: 44
End: 2024-02-29
Payer: COMMERCIAL

## 2024-02-29 ENCOUNTER — APPOINTMENT (OUTPATIENT)
Dept: CARDIOLOGY | Facility: HOSPITAL | Age: 44
DRG: 743 | End: 2024-02-29
Payer: COMMERCIAL

## 2024-02-29 VITALS
DIASTOLIC BLOOD PRESSURE: 64 MMHG | OXYGEN SATURATION: 97 % | BODY MASS INDEX: 29.65 KG/M2 | SYSTOLIC BLOOD PRESSURE: 144 MMHG | RESPIRATION RATE: 16 BRPM | TEMPERATURE: 98.5 F | HEIGHT: 63 IN | HEART RATE: 90 BPM | WEIGHT: 167.33 LBS

## 2024-02-29 PROBLEM — N93.9 ABNORMAL UTERINE BLEEDING (AUB): Status: RESOLVED | Noted: 2023-11-17 | Resolved: 2024-02-29

## 2024-02-29 PROCEDURE — 93005 ELECTROCARDIOGRAM TRACING: CPT

## 2024-02-29 PROCEDURE — 2500000004 HC RX 250 GENERAL PHARMACY W/ HCPCS (ALT 636 FOR OP/ED): Performed by: STUDENT IN AN ORGANIZED HEALTH CARE EDUCATION/TRAINING PROGRAM

## 2024-02-29 PROCEDURE — 2500000001 HC RX 250 WO HCPCS SELF ADMINISTERED DRUGS (ALT 637 FOR MEDICARE OP): Performed by: STUDENT IN AN ORGANIZED HEALTH CARE EDUCATION/TRAINING PROGRAM

## 2024-02-29 RX ORDER — AMOXICILLIN 250 MG
2 CAPSULE ORAL 2 TIMES DAILY
Qty: 28 TABLET | Refills: 0 | Status: SHIPPED | OUTPATIENT
Start: 2024-02-29 | End: 2024-03-07

## 2024-02-29 RX ORDER — OXYCODONE HYDROCHLORIDE 5 MG/1
5 TABLET ORAL EVERY 4 HOURS PRN
Qty: 15 TABLET | Refills: 0 | Status: SHIPPED | OUTPATIENT
Start: 2024-02-29

## 2024-02-29 RX ORDER — ACETAMINOPHEN 325 MG/1
975 TABLET ORAL EVERY 6 HOURS
Qty: 360 TABLET | Refills: 0 | Status: SHIPPED | OUTPATIENT
Start: 2024-02-29 | End: 2024-03-30

## 2024-02-29 RX ORDER — IBUPROFEN 600 MG/1
600 TABLET ORAL EVERY 6 HOURS
Qty: 120 TABLET | Refills: 0 | Status: SHIPPED | OUTPATIENT
Start: 2024-02-29 | End: 2024-03-30

## 2024-02-29 RX ORDER — IBUPROFEN 600 MG/1
600 TABLET ORAL EVERY 6 HOURS PRN
COMMUNITY

## 2024-02-29 RX ADMIN — IBUPROFEN 600 MG: 600 TABLET, FILM COATED ORAL at 12:19

## 2024-02-29 RX ADMIN — POLYETHYLENE GLYCOL 3350 17 G: 17 POWDER, FOR SOLUTION ORAL at 09:03

## 2024-02-29 RX ADMIN — OXYCODONE HYDROCHLORIDE 10 MG: 5 TABLET ORAL at 09:02

## 2024-02-29 RX ADMIN — ACETAMINOPHEN 975 MG: 325 TABLET ORAL at 12:19

## 2024-02-29 SDOH — ECONOMIC STABILITY: TRANSPORTATION INSECURITY

## 2024-02-29 SDOH — ECONOMIC STABILITY: HOUSING INSECURITY: IN THE LAST 12 MONTHS, WAS THERE A TIME WHEN YOU WERE NOT ABLE TO PAY THE MORTGAGE OR RENT ON TIME?: YES

## 2024-02-29 SDOH — ECONOMIC STABILITY: FOOD INSECURITY

## 2024-02-29 SDOH — ECONOMIC STABILITY: FOOD INSECURITY: WITHIN THE PAST 12 MONTHS, YOU WORRIED THAT YOUR FOOD WOULD RUN OUT BEFORE YOU GOT MONEY TO BUY MORE.: NEVER TRUE

## 2024-02-29 SDOH — ECONOMIC STABILITY: FOOD INSECURITY: WITHIN THE PAST 12 MONTHS, THE FOOD YOU BOUGHT JUST DIDN’T LAST AND YOU DIDN’T HAVE MONEY TO GET MORE.: OFTEN TRUE

## 2024-02-29 SDOH — ECONOMIC STABILITY: INCOME INSECURITY: IN THE LAST 12 MONTHS, WAS THERE A TIME WHEN YOU WERE NOT ABLE TO PAY THE MORTGAGE OR RENT ON TIME?: YES

## 2024-02-29 SDOH — ECONOMIC STABILITY: HOUSING INSECURITY: IN THE LAST 12 MONTHS, HOW MANY PLACES HAVE YOU LIVED?: 1

## 2024-02-29 SDOH — ECONOMIC STABILITY: GENERAL

## 2024-02-29 SDOH — ECONOMIC STABILITY: HOUSING INSECURITY: IN THE PAST 12 MONTHS HAS THE ELECTRIC, GAS, OIL, OR WATER COMPANY THREATENED TO SHUT OFF SERVICES IN YOUR HOME?: NO

## 2024-02-29 SDOH — ECONOMIC STABILITY: FOOD INSECURITY: WITHIN THE PAST 12 MONTHS, YOU WORRIED THAT YOUR FOOD WOULD RUN OUT BEFORE YOU GOT THE MONEY TO BUY MORE.: NEVER TRUE

## 2024-02-29 SDOH — ECONOMIC STABILITY: FOOD INSECURITY: WITHIN THE PAST 12 MONTHS, THE FOOD YOU BOUGHT JUST DIDN'T LAST AND YOU DIDN'T HAVE MONEY TO GET MORE.: OFTEN TRUE

## 2024-02-29 SDOH — ECONOMIC STABILITY: TRANSPORTATION INSECURITY: IN THE PAST 12 MONTHS, HAS LACK OF TRANSPORTATION KEPT YOU FROM MEDICAL APPOINTMENTS OR FROM GETTING MEDICATIONS?: NO

## 2024-02-29 SDOH — ECONOMIC STABILITY: HOUSING INSECURITY

## 2024-02-29 ASSESSMENT — SOCIAL DETERMINANTS OF HEALTH (SDOH): IN THE PAST 12 MONTHS, HAS THE ELECTRIC, GAS, OIL, OR WATER COMPANY THREATENED TO SHUT OFF SERVICE IN YOUR HOME?: NO

## 2024-02-29 ASSESSMENT — PAIN SCALES - GENERAL
PAINLEVEL_OUTOF10: 0 - NO PAIN
PAINLEVEL_OUTOF10: 3
PAINLEVEL_OUTOF10: 7
PAINLEVEL_OUTOF10: 5 - MODERATE PAIN

## 2024-02-29 ASSESSMENT — PAIN - FUNCTIONAL ASSESSMENT
PAIN_FUNCTIONAL_ASSESSMENT: 0-10

## 2024-02-29 ASSESSMENT — ACTIVITIES OF DAILY LIVING (ADL): LACK_OF_TRANSPORTATION: NO

## 2024-02-29 ASSESSMENT — PAIN DESCRIPTION - DESCRIPTORS: DESCRIPTORS: ACHING

## 2024-02-29 NOTE — PROGRESS NOTES
Pharmacy Medication History Review    Gabriela Cabrera is a 44 y.o. female admitted for Abnormal uterine bleeding (AUB). Pharmacy reviewed the patient's [id number] medications and allergies for accuracy.    The list below reflectives the updated PTA list. Please review each medication in order reconciliation for additional clarification and justification.  No medications prior to admission.        The list below reflectives the updated allergy list. Please review each documented allergy for additional clarification and justification.  Allergies  Reviewed by Anni Palacios RN on 2/27/2024   No Known Allergies         Below are additional concerns with the patient's PTA list.  Prior to Admission Medications   Prescriptions Last Dose Informant   OREGANO OIL ORAL Past Month    Sig: Take 1 Dose by mouth once daily.   aspirin-acetaminophen-caffeine (Excedrin Migraine) 250-250-65 mg tablet Past Week    Sig: Take 1 tablet by mouth every 8 hours if needed for headaches.   chlorhexidine (Peridex) 0.12 % solution 2/27/2024    Sig: Use 15 mL in the mouth or throat once daily for 2 days.   cholecalciferol (Vitamin D-3) 25 MCG (1000 UT) capsule Past Month    Sig: Take by mouth.   ferrous sulfate 325 (65 Fe) MG EC tablet 2/26/2024    Sig: Take 1 tablet (325 mg) by mouth once daily with a meal. Do not crush, chew, or split.   ibuprofen 600 mg tablet     Sig: Take 1 tablet (600 mg) by mouth every 6 hours if needed for mild pain (1 - 3).   norethindrone (Aygestin) 5 mg tablet 2/27/2024    Sig: Take 2 tablets (10 mg) by mouth once daily.      Facility-Administered Medications: None        Avis Alexander CPhT

## 2024-02-29 NOTE — CARE PLAN
The patient's goals for the shift include      The clinical goals for the shift include pt will rate pain less than 3    Over the shift, the patient did not make progress toward the following goals. Barriers to progression include . Recommendations to address these barriers include   Problem: Skin  Goal: Decreased wound size/increased tissue granulation at next dressing change  Outcome: Progressing  Goal: Participates in plan/prevention/treatment measures  Outcome: Progressing  Goal: Prevent/manage excess moisture  Outcome: Progressing  Goal: Prevent/minimize sheer/friction injuries  Outcome: Progressing  Goal: Promote/optimize nutrition  Outcome: Progressing  Goal: Promote skin healing  Outcome: Progressing     Problem: Fall/Injury  Goal: Not fall by end of shift  Outcome: Progressing  Goal: Be free from injury by end of the shift  Outcome: Progressing  Goal: Verbalize understanding of personal risk factors for fall in the hospital  Outcome: Progressing  Goal: Verbalize understanding of risk factor reduction measures to prevent injury from fall in the home  Outcome: Progressing  Goal: Use assistive devices by end of the shift  Outcome: Progressing  Goal: Pace activities to prevent fatigue by end of the shift  Outcome: Progressing   .

## 2024-02-29 NOTE — DISCHARGE SUMMARY
Discharge Summary    Admission Date: 2/27/2024  Discharge Date: 2/29/2024    Discharge Diagnosis  Abnormal uterine bleeding (AUB)    Hospital Course  Patient was admitted after undergoing uncomplicated total abdominal hysterectomy with bilateral salpingectomy via low transverse incision.  She then recovered on the postoperative for and progressed to toward meeting all postoperative milestones including having pain in good control tolerating a regular diet voiding spontaneously passing flatus passing stool and ambulating without assistance.  Her hemoglobin was stable on postop day 1.  Discharged on postop day 2.      Pertinent Physical Exam At Time of Discharge  GENERAL: Examination reveals a well developed, well nourished, gravid female in no acute distress. She is alert and cooperative.  LUNGS: Unlabored  ABDOMEN: Soft nontender with minimal distention; low transverse incision with dressing in place  EXTREMITIES: no redness or tenderness in the calves or thighs, no edema      Discharge Meds     Your medication list        START taking these medications        Instructions Last Dose Given Next Dose Due   acetaminophen 325 mg tablet  Commonly known as: Tylenol  Notes to patient: For 2 weeks per Dr. Berhane Yeboah MD      Take 3 tablets (975 mg) by mouth every 6 hours.       ibuprofen 600 mg tablet      Take 1 tablet (600 mg) by mouth every 6 hours.       oxyCODONE 5 mg immediate release tablet  Commonly known as: Roxicodone      Take 1 tablet (5 mg) by mouth every 4 hours if needed for severe pain (7 - 10).       sennosides-docusate sodium 8.6-50 mg tablet  Commonly known as: Ewelina-Colace      Take 2 tablets by mouth 2 times a day for 7 days.              CONTINUE taking these medications        Instructions Last Dose Given Next Dose Due   aspirin-acetaminophen-caffeine 250-250-65 mg tablet  Commonly known as: Excedrin Migraine           cholecalciferol 25 MCG (1000 UT) capsule  Commonly known as: Vitamin D-3            ferrous sulfate 325 (65 Fe) MG EC tablet      Take 1 tablet (325 mg) by mouth once daily with a meal. Do not crush, chew, or split.       OREGANO OIL ORAL                  STOP taking these medications      chlorhexidine 0.12 % solution  Commonly known as: Peridex        norethindrone 5 mg tablet  Commonly known as: Aygestin                  Where to Get Your Medications        These medications were sent to [id number] - [place] [id number] [facility] AT [address], [facility] [address]      Phone: [phone]   acetaminophen 325 mg tablet  ibuprofen 600 mg tablet  oxyCODONE 5 mg immediate release tablet  sennosides-docusate sodium 8.6-50 mg tablet          Complications Requiring Follow-Up  None    Test Results Pending At Discharge  Pending Labs       Order Current Status    POCT pregnancy, urine manually resulted In process    Surgical Pathology Exam In process            Outpatient Follow-Up  Future Appointments   Date Time Provider Department Center   3/12/2024  9:45 AM Berhane Yeboah MD [id number] None   4/9/2024  9:45 AM Berhane Yeboah MD [id number] None       I spent 30 minutes in the professional and overall care of this patient.      Berhane Yeboah MD

## 2024-03-01 NOTE — TELEPHONE ENCOUNTER
RN called [employer] and submitted PA for oxyCODONE HCL 5mg  PA approved  RN called Patient and left voicemail informing her of approval  Roseanne De La Rosa RN

## 2024-03-12 ENCOUNTER — OFFICE VISIT (OUTPATIENT)
Dept: OBSTETRICS AND GYNECOLOGY | Facility: CLINIC | Age: 44
End: 2024-03-12
Payer: COMMERCIAL

## 2024-03-12 VITALS
HEART RATE: 82 BPM | WEIGHT: 167 LBS | DIASTOLIC BLOOD PRESSURE: 82 MMHG | SYSTOLIC BLOOD PRESSURE: 137 MMHG | HEIGHT: 63 IN | BODY MASS INDEX: 29.59 KG/M2

## 2024-03-12 DIAGNOSIS — Z48.89 POSTOPERATIVE VISIT: Primary | ICD-10-CM

## 2024-03-12 PROCEDURE — 1036F TOBACCO NON-USER: CPT | Performed by: STUDENT IN AN ORGANIZED HEALTH CARE EDUCATION/TRAINING PROGRAM

## 2024-03-12 PROCEDURE — 99024 POSTOP FOLLOW-UP VISIT: CPT | Performed by: STUDENT IN AN ORGANIZED HEALTH CARE EDUCATION/TRAINING PROGRAM

## 2024-03-12 NOTE — PROGRESS NOTES
Subjective   Patient ID: Gabriela Cabrera is a 44 y.o. female who presents for post-operative visit (Patient here for post-op visit. Procedure done on 2/27/24).  Patient here for postop visit status post total abdominal hysterectomy via low transverse incision February 27.  Patient is without complaint.  She is no longer using as needed oxycodone and says that her pain is in good control.  Her constipation has improved since she stopped using oxycodone.  She has plenty of help at home she is tolerating a regular diet ambulating passing stool and urine normally.  She has no incisional complaints        Review of Systems   All other systems reviewed and are negative.      Objective   Physical Exam  Vitals reviewed.   Constitutional:       General: She is not in acute distress.     Appearance: She is not ill-appearing.   Pulmonary:      Effort: Pulmonary effort is normal.   Abdominal:      General: A surgical scar is present. There is no distension.      Palpations: Abdomen is soft. There is no mass.      Tenderness: There is no abdominal tenderness. There is no guarding or rebound.      Hernia: No hernia is present.      Comments: Well-approximated low transverse incision with no evidence of induration erythema or drainage   Skin:     Coloration: Skin is not pale.   Neurological:      Mental Status: She is alert.         Assessment/Plan     Postop visit    Patient here for postop visit.  Patient is status post total abdominal hysterectomy via low transverse incision.  Patient is without complaint and continues to meet postoperative milestones.  On exam incision is healing well.  Will plan to have patient return in 4 weeks for cuff check.  Patient to continue pelvic rest       Berhane Yeboah MD 03/12/24 1:04 PM

## 2024-04-09 ENCOUNTER — OFFICE VISIT (OUTPATIENT)
Dept: OBSTETRICS AND GYNECOLOGY | Facility: CLINIC | Age: 44
End: 2024-04-09
Payer: COMMERCIAL

## 2024-04-09 VITALS
DIASTOLIC BLOOD PRESSURE: 82 MMHG | SYSTOLIC BLOOD PRESSURE: 123 MMHG | HEIGHT: 63 IN | BODY MASS INDEX: 28.35 KG/M2 | WEIGHT: 160 LBS | HEART RATE: 86 BPM

## 2024-04-09 DIAGNOSIS — N95.1 VASOMOTOR SYMPTOMS DUE TO MENOPAUSE: ICD-10-CM

## 2024-04-09 DIAGNOSIS — Z48.89 POSTOPERATIVE VISIT: Primary | ICD-10-CM

## 2024-04-09 PROCEDURE — 1036F TOBACCO NON-USER: CPT | Performed by: STUDENT IN AN ORGANIZED HEALTH CARE EDUCATION/TRAINING PROGRAM

## 2024-04-09 PROCEDURE — 99024 POSTOP FOLLOW-UP VISIT: CPT | Performed by: STUDENT IN AN ORGANIZED HEALTH CARE EDUCATION/TRAINING PROGRAM

## 2024-04-09 NOTE — PROGRESS NOTES
Subjective   Patient ID: Gabriela Cabrera is a 44 y.o. female who presents for patient follow up visit (Patient here for 6 weeks procedure follow up/Mammo 2-16-23 WNL).  Patient here for cuff check.  Patient does report some intermittent discomfort with urination.  Denies vaginal bleeding or jennifer abdominal pain.  Patient does report hot flashes occurring several times a day as well as night sweats.  She is unsure if she wants to pursue hormone replacement therapy.        Review of Systems   All other systems reviewed and are negative.      Objective   Physical Exam  Vitals reviewed.   Constitutional:       General: She is not in acute distress.     Appearance: She is not ill-appearing.   Pulmonary:      Effort: Pulmonary effort is normal.   Abdominal:      General: A surgical scar is present. There is no distension.      Palpations: Abdomen is soft.      Tenderness: There is no abdominal tenderness.      Hernia: No hernia is present. There is no hernia in the left inguinal area or right inguinal area.      Comments: Approximately low transverse incision with no erythema induration or drainage.   Genitourinary:     General: Normal vulva.      Labia:         Right: No rash, tenderness, lesion or injury.         Left: No rash, tenderness, lesion or injury.       Urethra: No urethral lesion.      Vagina: No vaginal discharge, erythema, tenderness, bleeding, lesions or prolapsed vaginal walls.      Uterus: Absent.       Adnexa: Right adnexa normal and left adnexa normal.        Right: No mass, tenderness or fullness.          Left: No mass, tenderness or fullness.        Comments: Cuff visually and digitally intact  Lymphadenopathy:      Lower Body: No right inguinal adenopathy. No left inguinal adenopathy.   Skin:     Coloration: Skin is not pale.   Neurological:      Mental Status: She is alert.         Assessment/Plan   Diagnoses and all orders for this visit:  Postoperative visit  Vasomotor symptoms due to  menopause    44-year-old here for cuff check.  Cuff is intact on exam.  Low transverse incision well-healing.  Patient is cleared to resume normal activities as tolerated and discontinue pelvic rest.  Patient does report worsening vasomotor symptoms.  Encouraged patient to trial increasing intake of wm estrogens and consider whether or not she would like to start hormone replacement therapy and the future.  Let patient follow-up in 1 month to discuss menopausal symptoms.       Berhane Yeboah MD 04/09/24 4:54 PM

## 2024-05-14 ENCOUNTER — APPOINTMENT (OUTPATIENT)
Dept: OBSTETRICS AND GYNECOLOGY | Facility: CLINIC | Age: 44
End: 2024-05-14
Payer: COMMERCIAL

## 2024-05-29 ENCOUNTER — DOCUMENTATION (OUTPATIENT)
Dept: OBSTETRICS AND GYNECOLOGY | Facility: CLINIC | Age: 44
End: 2024-05-29
Payer: COMMERCIAL

## 2024-05-29 NOTE — PROGRESS NOTES
Additional requested FMLA documentation provided and faxed  Documentation will be uploaded to [employer]  Roseanne De La Rosa RN

## 2024-06-05 ENCOUNTER — OFFICE VISIT (OUTPATIENT)
Dept: OBSTETRICS AND GYNECOLOGY | Facility: CLINIC | Age: 44
End: 2024-06-05
Payer: COMMERCIAL

## 2024-06-05 VITALS
SYSTOLIC BLOOD PRESSURE: 130 MMHG | BODY MASS INDEX: 28.17 KG/M2 | HEIGHT: 63 IN | DIASTOLIC BLOOD PRESSURE: 83 MMHG | WEIGHT: 159 LBS

## 2024-06-05 DIAGNOSIS — N89.8 VAGINAL IRRITATION: Primary | ICD-10-CM

## 2024-06-05 DIAGNOSIS — N95.1 VASOMOTOR SYMPTOMS DUE TO MENOPAUSE: ICD-10-CM

## 2024-06-05 DIAGNOSIS — N95.1 VAGINAL DRYNESS, MENOPAUSAL: ICD-10-CM

## 2024-06-05 LAB
APPEARANCE UR: CLEAR
BILIRUB UR STRIP.AUTO-MCNC: NEGATIVE MG/DL
COLOR UR: NORMAL
GLUCOSE UR STRIP.AUTO-MCNC: NORMAL MG/DL
KETONES UR STRIP.AUTO-MCNC: NEGATIVE MG/DL
LEUKOCYTE ESTERASE UR QL STRIP.AUTO: NEGATIVE
NITRITE UR QL STRIP.AUTO: NEGATIVE
PH UR STRIP.AUTO: 5.5 [PH]
POC BLOOD, URINE: NEGATIVE
POC GLUCOSE, URINE: NEGATIVE MG/DL
POC KETONES, URINE: NEGATIVE MG/DL
POC LEUKOCYTES, URINE: NEGATIVE
POC NITRITE,URINE: NEGATIVE
POC PROTEIN, URINE: NEGATIVE MG/DL
PROT UR STRIP.AUTO-MCNC: NEGATIVE MG/DL
RBC # UR STRIP.AUTO: NEGATIVE /UL
SP GR UR STRIP.AUTO: 1.02
UROBILINOGEN UR STRIP.AUTO-MCNC: NORMAL MG/DL

## 2024-06-05 PROCEDURE — 81003 URINALYSIS AUTO W/O SCOPE: CPT

## 2024-06-05 PROCEDURE — 87205 SMEAR GRAM STAIN: CPT

## 2024-06-05 PROCEDURE — 99214 OFFICE O/P EST MOD 30 MIN: CPT | Performed by: STUDENT IN AN ORGANIZED HEALTH CARE EDUCATION/TRAINING PROGRAM

## 2024-06-05 PROCEDURE — 87591 N.GONORRHOEAE DNA AMP PROB: CPT

## 2024-06-05 PROCEDURE — 81003 URINALYSIS AUTO W/O SCOPE: CPT | Performed by: STUDENT IN AN ORGANIZED HEALTH CARE EDUCATION/TRAINING PROGRAM

## 2024-06-05 PROCEDURE — 87661 TRICHOMONAS VAGINALIS AMPLIF: CPT

## 2024-06-05 PROCEDURE — 87491 CHLMYD TRACH DNA AMP PROBE: CPT

## 2024-06-05 RX ORDER — ESTRADIOL 0.04 MG/D
1 PATCH TRANSDERMAL
Qty: 12 PATCH | Refills: 3 | Status: SHIPPED | OUTPATIENT
Start: 2024-06-09 | End: 2025-06-09

## 2024-06-05 ASSESSMENT — ENCOUNTER SYMPTOMS
MUSCULOSKELETAL NEGATIVE: 0
ENDOCRINE NEGATIVE: 0
RESPIRATORY NEGATIVE: 0
EYES NEGATIVE: 0
NEUROLOGICAL NEGATIVE: 0
HEMATOLOGIC/LYMPHATIC NEGATIVE: 0
PSYCHIATRIC NEGATIVE: 0
CONSTITUTIONAL NEGATIVE: 0
CARDIOVASCULAR NEGATIVE: 0
ALLERGIC/IMMUNOLOGIC NEGATIVE: 0
GASTROINTESTINAL NEGATIVE: 0

## 2024-06-05 ASSESSMENT — PAIN SCALES - GENERAL: PAINLEVEL: 0-NO PAIN

## 2024-06-05 NOTE — PROGRESS NOTES
Subjective   Patient ID: Gabriela Cabrera is a 44 y.o. female who presents for Follow-up (Follow up visit/menopause last pap 12/20/22 wnl HPV negative last mammogram 2/16/23 benign).  Patient here for follow-up of menopausal symptoms.  Patient is reporting frequent bothersome hot flashes as well as mood changes.  She is also reporting some problems with sexual response.  She also reports some vaginal irritation and would like to be evaluated for any possible infections.  Patient is amenable to starting systemic estrogen.        Review of Systems   All other systems reviewed and are negative.      Objective   Physical Exam  Vitals reviewed. Exam conducted with a chaperone present.   Constitutional:       General: She is not in acute distress.     Appearance: She is not ill-appearing.   Pulmonary:      Effort: Pulmonary effort is normal.   Genitourinary:     General: Normal vulva.      Exam position: Lithotomy position.      Vagina: No signs of injury and foreign body. No vaginal discharge, erythema, tenderness, bleeding, lesions or prolapsed vaginal walls.      Cervix: No discharge, friability, lesion, erythema or cervical bleeding.   Neurological:      Mental Status: She is alert.         Assessment/Plan   Diagnoses and all orders for this visit:  Vasomotor symptoms due to menopause  -     estradiol (Climara) 0.0375 mg/24 hr patch; Place 1 patch over 7 days on the skin 1 (one) time per week.  Vaginal dryness, menopausal  Vaginal irritation  -     Trichomonas vaginalis, Nucleic Acid Detection  -     C. Trachomatis / N. Gonorrhoeae, Amplified Detection  -     Vaginitis Gram Stain For Bacterial Vaginosis + Yeast  -     Urinalysis with Reflex Culture and Microscopic    Patient here for follow-up of vasomotor symptoms.  Will start low-dose Climara patch and follow-up symptoms in 2 months.  Patient is also reporting some vaginal dryness that she attributes to decreased sexual response.  Will follow-up symptoms  once patient is on Climara patch and consider starting vaginal estrogen.  Regarding her vaginal irritation STI and vaginitis swabs were obtained clinical impression is open no vaginitis we will treat as indicated.       Berhane Yeboah MD 06/05/24 10:02 AM

## 2024-06-06 LAB
C TRACH RRNA SPEC QL NAA+PROBE: NEGATIVE
HOLD SPECIMEN: NORMAL
N GONORRHOEA DNA SPEC QL PROBE+SIG AMP: NEGATIVE
T VAGINALIS RRNA SPEC QL NAA+PROBE: NEGATIVE

## 2024-06-07 LAB
CLUE CELLS VAG LPF-#/AREA: PRESENT /[LPF]
NUGENT SCORE: 8
YEAST VAG WET PREP-#/AREA: ABNORMAL

## 2024-06-12 DIAGNOSIS — B96.89 BACTERIAL VAGINOSIS: Primary | ICD-10-CM

## 2024-06-12 DIAGNOSIS — N76.0 BACTERIAL VAGINOSIS: Primary | ICD-10-CM

## 2024-06-12 RX ORDER — METRONIDAZOLE 500 MG/1
500 TABLET ORAL 2 TIMES DAILY
Qty: 14 TABLET | Refills: 0 | Status: SHIPPED | OUTPATIENT
Start: 2024-06-12 | End: 2024-06-19

## 2024-07-15 ENCOUNTER — TELEPHONE (OUTPATIENT)
Dept: OBSTETRICS AND GYNECOLOGY | Facility: CLINIC | Age: 44
End: 2024-07-15
Payer: COMMERCIAL

## 2024-07-15 DIAGNOSIS — N89.8 VAGINAL DISCHARGE: Primary | ICD-10-CM

## 2024-07-15 RX ORDER — FLUCONAZOLE 150 MG/1
150 TABLET ORAL ONCE
Qty: 1 TABLET | Refills: 0 | Status: SHIPPED | OUTPATIENT
Start: 2024-07-15 | End: 2024-07-15

## 2024-08-02 ENCOUNTER — APPOINTMENT (OUTPATIENT)
Dept: OBSTETRICS AND GYNECOLOGY | Facility: CLINIC | Age: 44
End: 2024-08-02
Payer: COMMERCIAL

## 2024-11-25 ENCOUNTER — APPOINTMENT (OUTPATIENT)
Dept: PRIMARY CARE | Facility: CLINIC | Age: 44
End: 2024-11-25
Payer: COMMERCIAL

## 2025-01-31 ENCOUNTER — APPOINTMENT (OUTPATIENT)
Dept: PRIMARY CARE | Facility: CLINIC | Age: 45
End: 2025-01-31
Payer: COMMERCIAL

## 2025-01-31 VITALS
OXYGEN SATURATION: 96 % | BODY MASS INDEX: 30.65 KG/M2 | WEIGHT: 173 LBS | HEART RATE: 75 BPM | DIASTOLIC BLOOD PRESSURE: 79 MMHG | HEIGHT: 63 IN | SYSTOLIC BLOOD PRESSURE: 120 MMHG

## 2025-01-31 DIAGNOSIS — Z12.31 ENCOUNTER FOR SCREENING MAMMOGRAM FOR MALIGNANT NEOPLASM OF BREAST: ICD-10-CM

## 2025-01-31 DIAGNOSIS — D50.9 IRON DEFICIENCY ANEMIA, UNSPECIFIED IRON DEFICIENCY ANEMIA TYPE: ICD-10-CM

## 2025-01-31 DIAGNOSIS — E07.89 THYROID FULLNESS: ICD-10-CM

## 2025-01-31 DIAGNOSIS — Z00.00 ANNUAL PHYSICAL EXAM: Primary | ICD-10-CM

## 2025-01-31 DIAGNOSIS — Z12.11 ENCOUNTER FOR SCREENING FOR MALIGNANT NEOPLASM OF COLON: ICD-10-CM

## 2025-01-31 PROCEDURE — 99396 PREV VISIT EST AGE 40-64: CPT | Performed by: INTERNAL MEDICINE

## 2025-01-31 PROCEDURE — 1036F TOBACCO NON-USER: CPT | Performed by: INTERNAL MEDICINE

## 2025-01-31 PROCEDURE — 3008F BODY MASS INDEX DOCD: CPT | Performed by: INTERNAL MEDICINE

## 2025-01-31 ASSESSMENT — PATIENT HEALTH QUESTIONNAIRE - PHQ9
1. LITTLE INTEREST OR PLEASURE IN DOING THINGS: NOT AT ALL
SUM OF ALL RESPONSES TO PHQ9 QUESTIONS 1 AND 2: 0
2. FEELING DOWN, DEPRESSED OR HOPELESS: NOT AT ALL

## 2025-01-31 NOTE — PROGRESS NOTES
"Subjective   Patient ID: Gabriela Cabrera is a 45 y.o. female who presents for Annual Exam (Patient here for complete physical exam).    HPI   The patient presents for an annual wellness exam with no concerns.    Review of Systems  Negative   Objective   /79   Pulse 75   Ht 1.6 m (5' 3\")   Wt 78.5 kg (173 lb)   LMP 02/27/2024 (Approximate) Comment: HCG negative  SpO2 96%   BMI 30.65 kg/m²     Physical Exam  NAD. Cooperative.  HEENT: WNL  Neck: thyroid fullness  Lungs CTA  Heart: RRR  Abdomen: WNL  Musculoskeletal system: WNL  Neurologic exam: WNL    Assessment/Plan   Diagnoses and all orders for this visit:  Annual physical exam  -     Comprehensive metabolic panel; Future  -     CBC; Future  -     TSH; Future  -     Lipid panel; Future  -     Hemoglobin A1C; Future  Encounter for screening for malignant neoplasm of colon  -     Colonoscopy Screening; Average Risk Patient; Future  Encounter for screening mammogram for malignant neoplasm of breast  -     BI mammo bilateral screening tomosynthesis; Future  Iron deficiency anemia, unspecified iron deficiency anemia type  -     Transferrin; Future  -     Iron and TIBC; Future  -     Ferritin; Future  Thyroid fullness  -     US thyroid; Future    Influenza vaccine was discussed and recommended, the patient declined.         "

## 2025-02-01 LAB
ALBUMIN SERPL-MCNC: 4.8 G/DL (ref 3.6–5.1)
ALP SERPL-CCNC: 52 U/L (ref 31–125)
ALT SERPL-CCNC: 18 U/L (ref 6–29)
ANION GAP SERPL CALCULATED.4IONS-SCNC: 8 MMOL/L (CALC) (ref 7–17)
AST SERPL-CCNC: 19 U/L (ref 10–35)
BILIRUB SERPL-MCNC: 0.4 MG/DL (ref 0.2–1.2)
BUN SERPL-MCNC: 14 MG/DL (ref 7–25)
CALCIUM SERPL-MCNC: 9.7 MG/DL (ref 8.6–10.2)
CHLORIDE SERPL-SCNC: 105 MMOL/L (ref 98–110)
CHOLEST SERPL-MCNC: 216 MG/DL
CHOLEST/HDLC SERPL: 2.8 (CALC)
CO2 SERPL-SCNC: 25 MMOL/L (ref 20–32)
CREAT SERPL-MCNC: 0.92 MG/DL (ref 0.5–0.99)
EGFRCR SERPLBLD CKD-EPI 2021: 78 ML/MIN/1.73M2
ERYTHROCYTE [DISTWIDTH] IN BLOOD BY AUTOMATED COUNT: 11.7 % (ref 11–15)
EST. AVERAGE GLUCOSE BLD GHB EST-MCNC: 105 MG/DL
EST. AVERAGE GLUCOSE BLD GHB EST-SCNC: 5.8 MMOL/L
FERRITIN SERPL-MCNC: 42 NG/ML (ref 16–232)
GLUCOSE SERPL-MCNC: 92 MG/DL (ref 65–99)
HBA1C MFR BLD: 5.3 % OF TOTAL HGB
HCT VFR BLD AUTO: 37.5 % (ref 35–45)
HDLC SERPL-MCNC: 77 MG/DL
HGB BLD-MCNC: 11.7 G/DL (ref 11.7–15.5)
IRON SATN MFR SERPL: 19 % (CALC) (ref 16–45)
IRON SERPL-MCNC: 70 MCG/DL (ref 40–190)
LDLC SERPL CALC-MCNC: 119 MG/DL (CALC)
MCH RBC QN AUTO: 28.2 PG (ref 27–33)
MCHC RBC AUTO-ENTMCNC: 31.2 G/DL (ref 32–36)
MCV RBC AUTO: 90.4 FL (ref 80–100)
NONHDLC SERPL-MCNC: 139 MG/DL (CALC)
PLATELET # BLD AUTO: 287 THOUSAND/UL (ref 140–400)
PMV BLD REES-ECKER: 8.5 FL (ref 7.5–12.5)
POTASSIUM SERPL-SCNC: 5.1 MMOL/L (ref 3.5–5.3)
PROT SERPL-MCNC: 7.5 G/DL (ref 6.1–8.1)
RBC # BLD AUTO: 4.15 MILLION/UL (ref 3.8–5.1)
SODIUM SERPL-SCNC: 138 MMOL/L (ref 135–146)
TIBC SERPL-MCNC: 371 MCG/DL (CALC) (ref 250–450)
TRANSFERRIN SERPL-MCNC: NORMAL MG/DL
TRIGL SERPL-MCNC: 95 MG/DL
TSH SERPL-ACNC: 1.14 MIU/L
WBC # BLD AUTO: 5.4 THOUSAND/UL (ref 3.8–10.8)

## 2025-02-03 LAB
ALBUMIN SERPL-MCNC: 4.8 G/DL (ref 3.6–5.1)
ALP SERPL-CCNC: 52 U/L (ref 31–125)
ALT SERPL-CCNC: 18 U/L (ref 6–29)
ANION GAP SERPL CALCULATED.4IONS-SCNC: 8 MMOL/L (CALC) (ref 7–17)
AST SERPL-CCNC: 19 U/L (ref 10–35)
BILIRUB SERPL-MCNC: 0.4 MG/DL (ref 0.2–1.2)
BUN SERPL-MCNC: 14 MG/DL (ref 7–25)
CALCIUM SERPL-MCNC: 9.7 MG/DL (ref 8.6–10.2)
CHLORIDE SERPL-SCNC: 105 MMOL/L (ref 98–110)
CHOLEST SERPL-MCNC: 216 MG/DL
CHOLEST/HDLC SERPL: 2.8 (CALC)
CO2 SERPL-SCNC: 25 MMOL/L (ref 20–32)
CREAT SERPL-MCNC: 0.92 MG/DL (ref 0.5–0.99)
EGFRCR SERPLBLD CKD-EPI 2021: 78 ML/MIN/1.73M2
ERYTHROCYTE [DISTWIDTH] IN BLOOD BY AUTOMATED COUNT: 11.7 % (ref 11–15)
EST. AVERAGE GLUCOSE BLD GHB EST-MCNC: 105 MG/DL
EST. AVERAGE GLUCOSE BLD GHB EST-SCNC: 5.8 MMOL/L
FERRITIN SERPL-MCNC: 42 NG/ML (ref 16–232)
GLUCOSE SERPL-MCNC: 92 MG/DL (ref 65–99)
HBA1C MFR BLD: 5.3 % OF TOTAL HGB
HCT VFR BLD AUTO: 37.5 % (ref 35–45)
HDLC SERPL-MCNC: 77 MG/DL
HGB BLD-MCNC: 11.7 G/DL (ref 11.7–15.5)
IRON SATN MFR SERPL: 19 % (CALC) (ref 16–45)
IRON SERPL-MCNC: 70 MCG/DL (ref 40–190)
LDLC SERPL CALC-MCNC: 119 MG/DL (CALC)
MCH RBC QN AUTO: 28.2 PG (ref 27–33)
MCHC RBC AUTO-ENTMCNC: 31.2 G/DL (ref 32–36)
MCV RBC AUTO: 90.4 FL (ref 80–100)
NONHDLC SERPL-MCNC: 139 MG/DL (CALC)
PLATELET # BLD AUTO: 287 THOUSAND/UL (ref 140–400)
PMV BLD REES-ECKER: 8.5 FL (ref 7.5–12.5)
POTASSIUM SERPL-SCNC: 5.1 MMOL/L (ref 3.5–5.3)
PROT SERPL-MCNC: 7.5 G/DL (ref 6.1–8.1)
RBC # BLD AUTO: 4.15 MILLION/UL (ref 3.8–5.1)
SODIUM SERPL-SCNC: 138 MMOL/L (ref 135–146)
TIBC SERPL-MCNC: 371 MCG/DL (CALC) (ref 250–450)
TRANSFERRIN SERPL-MCNC: 294 MG/DL (ref 188–341)
TRIGL SERPL-MCNC: 95 MG/DL
TSH SERPL-ACNC: 1.14 MIU/L
WBC # BLD AUTO: 5.4 THOUSAND/UL (ref 3.8–10.8)

## 2025-02-06 DIAGNOSIS — Z12.11 COLON CANCER SCREENING: ICD-10-CM

## 2025-02-06 RX ORDER — SODIUM, POTASSIUM,MAG SULFATES 17.5-3.13G
SOLUTION, RECONSTITUTED, ORAL ORAL
Qty: 1 EACH | Refills: 0 | Status: SHIPPED | OUTPATIENT
Start: 2025-02-06

## 2025-02-10 ENCOUNTER — TELEPHONE (OUTPATIENT)
Dept: OBSTETRICS AND GYNECOLOGY | Facility: CLINIC | Age: 45
End: 2025-02-10
Payer: COMMERCIAL

## 2025-02-18 ENCOUNTER — HOSPITAL ENCOUNTER (OUTPATIENT)
Dept: RADIOLOGY | Facility: CLINIC | Age: 45
Discharge: HOME | End: 2025-02-18
Payer: COMMERCIAL

## 2025-02-18 VITALS — WEIGHT: 173.06 LBS | BODY MASS INDEX: 30.66 KG/M2 | HEIGHT: 63 IN

## 2025-02-18 DIAGNOSIS — E07.89 THYROID FULLNESS: ICD-10-CM

## 2025-02-18 DIAGNOSIS — Z12.31 ENCOUNTER FOR SCREENING MAMMOGRAM FOR MALIGNANT NEOPLASM OF BREAST: ICD-10-CM

## 2025-02-18 PROCEDURE — 77067 SCR MAMMO BI INCL CAD: CPT | Performed by: RADIOLOGY

## 2025-02-18 PROCEDURE — 77063 BREAST TOMOSYNTHESIS BI: CPT | Performed by: RADIOLOGY

## 2025-02-18 PROCEDURE — 76536 US EXAM OF HEAD AND NECK: CPT | Performed by: RADIOLOGY

## 2025-02-18 PROCEDURE — 77067 SCR MAMMO BI INCL CAD: CPT

## 2025-02-18 PROCEDURE — 76536 US EXAM OF HEAD AND NECK: CPT

## 2025-02-20 DIAGNOSIS — R93.89 ABNORMAL THYROID ULTRASOUND: Primary | ICD-10-CM

## 2025-02-27 ENCOUNTER — APPOINTMENT (OUTPATIENT)
Dept: GASTROENTEROLOGY | Facility: EXTERNAL LOCATION | Age: 45
End: 2025-02-27
Payer: COMMERCIAL

## 2025-02-27 DIAGNOSIS — Z12.11 ENCOUNTER FOR SCREENING FOR MALIGNANT NEOPLASM OF COLON: Primary | ICD-10-CM

## 2025-02-27 PROCEDURE — 45378 DIAGNOSTIC COLONOSCOPY: CPT | Performed by: INTERNAL MEDICINE

## 2025-07-02 ENCOUNTER — APPOINTMENT (OUTPATIENT)
Dept: OBSTETRICS AND GYNECOLOGY | Facility: CLINIC | Age: 45
End: 2025-07-02
Payer: COMMERCIAL

## 2025-07-02 VITALS
BODY MASS INDEX: 29.77 KG/M2 | DIASTOLIC BLOOD PRESSURE: 82 MMHG | WEIGHT: 168 LBS | SYSTOLIC BLOOD PRESSURE: 122 MMHG | HEIGHT: 63 IN

## 2025-07-02 DIAGNOSIS — N95.1 VASOMOTOR SYMPTOMS DUE TO MENOPAUSE: ICD-10-CM

## 2025-07-02 DIAGNOSIS — Z01.419 ENCOUNTER FOR ANNUAL ROUTINE GYNECOLOGICAL EXAMINATION: Primary | ICD-10-CM

## 2025-07-02 DIAGNOSIS — N95.1 VAGINAL DRYNESS, MENOPAUSAL: ICD-10-CM

## 2025-07-02 PROCEDURE — 3008F BODY MASS INDEX DOCD: CPT | Performed by: STUDENT IN AN ORGANIZED HEALTH CARE EDUCATION/TRAINING PROGRAM

## 2025-07-02 PROCEDURE — 99396 PREV VISIT EST AGE 40-64: CPT | Performed by: STUDENT IN AN ORGANIZED HEALTH CARE EDUCATION/TRAINING PROGRAM

## 2025-07-02 PROCEDURE — 1036F TOBACCO NON-USER: CPT | Performed by: STUDENT IN AN ORGANIZED HEALTH CARE EDUCATION/TRAINING PROGRAM

## 2025-07-02 RX ORDER — ESTRADIOL 0.1 MG/G
2 CREAM VAGINAL NIGHTLY
Qty: 34 G | Refills: 3 | Status: SHIPPED | OUTPATIENT
Start: 2025-07-02 | End: 2026-07-02

## 2025-07-02 RX ORDER — ESTRADIOL 1 MG/1
1 TABLET ORAL DAILY
Qty: 90 TABLET | Refills: 3 | Status: SHIPPED | OUTPATIENT
Start: 2025-07-02 | End: 2026-07-02

## 2025-07-02 ASSESSMENT — ENCOUNTER SYMPTOMS
LOSS OF SENSATION IN FEET: 0
DEPRESSION: 0
OCCASIONAL FEELINGS OF UNSTEADINESS: 0

## 2025-07-02 ASSESSMENT — PAIN SCALES - GENERAL: PAINLEVEL_OUTOF10: 0-NO PAIN

## 2025-07-02 NOTE — PROGRESS NOTES
Subjective   Patient ID: Gabriela Cabrera is a 45 y.o. female who presents for Annual Exam (Pt is here for her Annual exam. Pt has no other issue or concerns at this time./No pain/No fall/LMP Hysterectomy/Mammo 2/18/25 Benign/Pt declined chaperone/ST MA/).  Patient here for annual visit.  Main complaints are of hot flashes difficulty sleeping, brain fog and vaginal dryness.  Has not been taking her HRT would prefer to transition to pill from patch        Review of Systems   All other systems reviewed and are negative.      Objective   Physical Exam  Vitals reviewed. Exam conducted with a chaperone present.   Constitutional:       General: She is not in acute distress.     Appearance: Normal appearance. She is not ill-appearing.   Pulmonary:      Effort: Pulmonary effort is normal.   Chest:   Breasts:     Breasts are symmetrical.      Right: Normal. No swelling, bleeding, inverted nipple, mass, nipple discharge, skin change or tenderness.      Left: Normal. No swelling, bleeding, inverted nipple, mass, nipple discharge, skin change or tenderness.   Abdominal:      General: There is no distension.      Palpations: Abdomen is soft. There is no mass.      Tenderness: There is no abdominal tenderness. There is no guarding or rebound.      Hernia: There is no hernia in the left inguinal area or right inguinal area.   Genitourinary:     General: Normal vulva.      Exam position: Lithotomy position.      Labia:         Right: No rash, tenderness, lesion or injury.         Left: No rash, tenderness, lesion or injury.       Urethra: No prolapse, urethral swelling or urethral lesion.      Vagina: No vaginal discharge, erythema, tenderness, bleeding, lesions or prolapsed vaginal walls.      Cervix: No cervical motion tenderness, discharge, friability, lesion, erythema or cervical bleeding.      Uterus: Not deviated, not enlarged, not fixed, not tender and no uterine prolapse.       Adnexa:         Right: No mass,  tenderness or fullness.          Left: No mass, tenderness or fullness.     Musculoskeletal:      Right lower leg: No edema.      Left lower leg: No edema.   Lymphadenopathy:      Upper Body:      Right upper body: No supraclavicular, axillary or pectoral adenopathy.      Left upper body: No supraclavicular, axillary or pectoral adenopathy.      Lower Body: No right inguinal adenopathy. No left inguinal adenopathy.   Neurological:      Mental Status: She is alert.         Assessment/Plan   Diagnoses and all orders for this visit:  Encounter for annual routine gynecological examination  Vasomotor symptoms due to menopause  -     estradiol (Estrace) 1 mg tablet; Take 1 tablet (1 mg) by mouth once daily.  Vaginal dryness, menopausal  -     estradiol (Estrace) 0.01 % (0.1 mg/gram) vaginal cream; Insert 0.5 Applicatorfuls (2 g) into the vagina once daily at bedtime. At bedtime for 2 weeks, then at bedtime twice a week.    Breast and pelvic exams within normal limits.  Will restart HRT and transition to oral and vaginal Estrace as patient's cardiovascular risk is calculated to be low.  Will have patient follow-up in 1 month for symptom check.       Berhane Yeboah MD 07/02/25 3:36 PM

## 2025-07-07 ENCOUNTER — APPOINTMENT (OUTPATIENT)
Dept: ENDOCRINOLOGY | Facility: CLINIC | Age: 45
End: 2025-07-07
Payer: COMMERCIAL

## 2025-08-06 ENCOUNTER — APPOINTMENT (OUTPATIENT)
Dept: OBSTETRICS AND GYNECOLOGY | Facility: CLINIC | Age: 45
End: 2025-08-06
Payer: COMMERCIAL

## 2025-08-06 VITALS — WEIGHT: 171.8 LBS | BODY MASS INDEX: 30.43 KG/M2 | DIASTOLIC BLOOD PRESSURE: 67 MMHG | SYSTOLIC BLOOD PRESSURE: 100 MMHG

## 2025-08-06 DIAGNOSIS — N95.1 VASOMOTOR SYMPTOMS DUE TO MENOPAUSE: Primary | ICD-10-CM

## 2025-08-06 DIAGNOSIS — N95.1 VAGINAL DRYNESS, MENOPAUSAL: ICD-10-CM

## 2025-08-06 PROCEDURE — 99213 OFFICE O/P EST LOW 20 MIN: CPT | Performed by: STUDENT IN AN ORGANIZED HEALTH CARE EDUCATION/TRAINING PROGRAM

## 2025-08-06 PROCEDURE — 1036F TOBACCO NON-USER: CPT | Performed by: STUDENT IN AN ORGANIZED HEALTH CARE EDUCATION/TRAINING PROGRAM

## 2025-08-06 RX ORDER — ESTRADIOL 10 UG/1
10 TABLET, FILM COATED VAGINAL 2 TIMES WEEKLY
Qty: 18 TABLET | Refills: 3 | Status: SHIPPED | OUTPATIENT
Start: 2025-08-07

## 2025-08-06 ASSESSMENT — PAIN SCALES - GENERAL: PAINLEVEL_OUTOF10: 0-NO PAIN

## 2025-08-06 NOTE — PROGRESS NOTES
Subjective   Patient ID: Gabriela Cabrera is a 45 y.o. female who presents for Follow-up (Patient follow-up for estradiol pills and states they are working well. ).  Patient states that hot flashes are well-controlled with current regimen.  Says that she only wakes up at night once nightly.  Hot brain fog is also significantly reduced.  Vaginal estrogen is controlling her vaginal symptoms as well though she does find Estrace cream to be a messy preparation.        Review of Systems   All other systems reviewed and are negative.      Objective   Physical Exam  Vitals reviewed.   Constitutional:       General: She is not in acute distress.     Appearance: She is not ill-appearing.   Pulmonary:      Effort: Pulmonary effort is normal.     Skin:     Coloration: Skin is not pale.     Neurological:      Mental Status: She is alert.         Assessment/Plan   Diagnoses and all orders for this visit:  Vasomotor symptoms due to menopause  Vaginal dryness, menopausal  -     estradiol (Vagifem) 10 mcg tablet vaginal tablet; Insert 1 tablet (10 mcg) into the vagina 2 times a week.    Patient here for menopause follow-up.  Will continue Estrace tablet at 1 mg daily as her symptoms are well-controlled at this time.  Patient is complaining that Estrace cream is messy preparation therefore will order Vagifem tablets 10 mcg daily twice weekly.  Patient to follow-up in 3 months for symptom check.       Berhane Yeboah MD 08/06/25 9:08 AM

## 2025-08-15 ENCOUNTER — OFFICE VISIT (OUTPATIENT)
Dept: PRIMARY CARE | Facility: CLINIC | Age: 45
End: 2025-08-15
Payer: COMMERCIAL

## 2025-08-15 VITALS
DIASTOLIC BLOOD PRESSURE: 70 MMHG | HEIGHT: 63 IN | OXYGEN SATURATION: 99 % | WEIGHT: 168.2 LBS | SYSTOLIC BLOOD PRESSURE: 122 MMHG | HEART RATE: 74 BPM | BODY MASS INDEX: 29.8 KG/M2

## 2025-08-15 DIAGNOSIS — M70.62 TROCHANTERIC BURSITIS OF LEFT HIP: Primary | ICD-10-CM

## 2025-08-15 PROCEDURE — 99214 OFFICE O/P EST MOD 30 MIN: CPT | Performed by: INTERNAL MEDICINE

## 2025-08-15 PROCEDURE — 1036F TOBACCO NON-USER: CPT | Performed by: INTERNAL MEDICINE

## 2025-08-15 PROCEDURE — 3008F BODY MASS INDEX DOCD: CPT | Performed by: INTERNAL MEDICINE

## 2025-08-15 RX ORDER — MELOXICAM 7.5 MG/1
7.5-15 TABLET ORAL DAILY PRN
Qty: 60 TABLET | Refills: 11 | Status: SHIPPED | OUTPATIENT
Start: 2025-08-15 | End: 2026-08-15

## 2025-08-25 ENCOUNTER — APPOINTMENT (OUTPATIENT)
Dept: ENDOCRINOLOGY | Facility: CLINIC | Age: 45
End: 2025-08-25
Payer: COMMERCIAL

## 2025-11-12 ENCOUNTER — APPOINTMENT (OUTPATIENT)
Dept: OBSTETRICS AND GYNECOLOGY | Facility: CLINIC | Age: 45
End: 2025-11-12
Payer: COMMERCIAL

## (undated) DEVICE — CAUTERY, PENCIL, PUSH BUTTON, SMOKE EVAC, 70MM

## (undated) DEVICE — GLOVE, SURGEON, PREMIERPRO PI, MICRO, SZ-8.5, PF, WH

## (undated) DEVICE — SOLUTION, SCRUB EXIDINE, 4% CHG, 4 OZ

## (undated) DEVICE — SUTURE, VICRYL, 0, 18 IN, TIE, VIOLET

## (undated) DEVICE — SUTURE, VICRYL, 0, 36 IN, CT-1, UNDYED

## (undated) DEVICE — Device

## (undated) DEVICE — HEMOSTAT, ARISTA, ABSORBABLE, 3 GRAMS

## (undated) DEVICE — HANDPIECE, POOLE SUCTION, W/O TUBING

## (undated) DEVICE — TOWEL, SURGICAL, NEURO, O/R, 16 X 26, BLUE, STERILE

## (undated) DEVICE — STRIP, SKIN CLOSURE, STERI STRIP, REINFORCED, 0.5 X 4 IN

## (undated) DEVICE — SUTURE, VICRYL, 2-0, 36 IN, CT-1, UNDYED

## (undated) DEVICE — GOWN, SURGICAL, SMARTGOWN, XX-LARGE, STERILE

## (undated) DEVICE — KIT, GUIDEWIRE BUCKET, W/LID, F/FETAL REMAINS

## (undated) DEVICE — PREP TRAY, VAGINAL

## (undated) DEVICE — DRAPE, INSTRUMENT, W/POUCH, STERI DRAPE, 7 X 11 IN, DISPOSABLE, STERILE

## (undated) DEVICE — DRAPE, INCISE, ANTIMICROBIAL, IOBAN 2, LARGE, 17 X 23 IN, DISPOSABLE, STERILE

## (undated) DEVICE — TRAY, SURESTEP, URINE METER, 16FR, COMPLETE, W/STATLOCK

## (undated) DEVICE — DRESSING, MEPILEX BORDER, POST-OP AG, 4 X 10 IN

## (undated) DEVICE — SUTURE, VICRYL, 4-0, 18 IN, PS2, UNDYED